# Patient Record
Sex: MALE | Race: WHITE | NOT HISPANIC OR LATINO | Employment: OTHER | URBAN - METROPOLITAN AREA
[De-identification: names, ages, dates, MRNs, and addresses within clinical notes are randomized per-mention and may not be internally consistent; named-entity substitution may affect disease eponyms.]

---

## 2017-04-27 ENCOUNTER — MYC REFILL (OUTPATIENT)
Dept: FAMILY MEDICINE | Facility: CLINIC | Age: 52
End: 2017-04-27

## 2017-04-27 DIAGNOSIS — Z71.89 OTHER SPECIFIED COUNSELING: ICD-10-CM

## 2017-04-27 RX ORDER — ZOLPIDEM TARTRATE 10 MG/1
10 TABLET ORAL
Qty: 20 TABLET | Refills: 1 | Status: SHIPPED | OUTPATIENT
Start: 2017-04-27 | End: 2017-09-26

## 2017-04-27 NOTE — TELEPHONE ENCOUNTER
FRANCINE,  Noted in 10/24/2016 OV #20 should last 6 months  Per fill dates, #40 has lasted 6 months    Controlled Substance Refill Request for Ambien    Last refill: 1/28/2017 #20     Last clinic visit: 10/24/2016     Clinic visit frequency required: not documented  Next appt: none    Controlled substance agreement on file: No.    Documentation in problem list reviewed:  No diagnosis to start documentation under    Processing:  Fax Rx to [facility] in [place] on [address] [facility]    RX [place] ([place]) reviewed:  reviewed- no concerns  [place]  [email or web address]    Please authorize if appropriate.  Thanks,  Iveth GIFFORD RN

## 2017-04-27 NOTE — TELEPHONE ENCOUNTER
Message from MyChart:  Original authorizing provider: MD Jaylan Abrahamrey Estrella would like a refill of the following medications:  zolpidem (AMBIEN) 10 MG tablet [Regis Mcelroy MD]    Preferred pharmacy: [facility] [id number] [place] [address] [address]    Comment:

## 2017-09-26 ENCOUNTER — OFFICE VISIT (OUTPATIENT)
Dept: FAMILY MEDICINE | Facility: CLINIC | Age: 52
End: 2017-09-26
Payer: COMMERCIAL

## 2017-09-26 VITALS
BODY MASS INDEX: 26.78 KG/M2 | OXYGEN SATURATION: 98 % | HEIGHT: 69 IN | TEMPERATURE: 97.7 F | HEART RATE: 95 BPM | DIASTOLIC BLOOD PRESSURE: 78 MMHG | SYSTOLIC BLOOD PRESSURE: 126 MMHG | WEIGHT: 180.8 LBS

## 2017-09-26 DIAGNOSIS — Z00.00 ROUTINE HISTORY AND PHYSICAL EXAMINATION OF ADULT: Primary | ICD-10-CM

## 2017-09-26 DIAGNOSIS — Z71.89 OTHER SPECIFIED COUNSELING: ICD-10-CM

## 2017-09-26 DIAGNOSIS — I10 ESSENTIAL HYPERTENSION WITH GOAL BLOOD PRESSURE LESS THAN 140/90: ICD-10-CM

## 2017-09-26 DIAGNOSIS — Z12.11 SPECIAL SCREENING FOR MALIGNANT NEOPLASMS, COLON: ICD-10-CM

## 2017-09-26 LAB
ANION GAP SERPL CALCULATED.3IONS-SCNC: 4 MMOL/L (ref 3–14)
BUN SERPL-MCNC: 20 MG/DL (ref 7–30)
CALCIUM SERPL-MCNC: 9.6 MG/DL (ref 8.5–10.1)
CHLORIDE SERPL-SCNC: 107 MMOL/L (ref 94–109)
CHOLEST SERPL-MCNC: 266 MG/DL
CO2 SERPL-SCNC: 30 MMOL/L (ref 20–32)
CREAT SERPL-MCNC: 0.76 MG/DL (ref 0.66–1.25)
GFR SERPL CREATININE-BSD FRML MDRD: >90 ML/MIN/1.7M2
GLUCOSE SERPL-MCNC: 103 MG/DL (ref 70–99)
HDLC SERPL-MCNC: 75 MG/DL
LDLC SERPL CALC-MCNC: 165 MG/DL
NONHDLC SERPL-MCNC: 191 MG/DL
POTASSIUM SERPL-SCNC: 4.2 MMOL/L (ref 3.4–5.3)
SODIUM SERPL-SCNC: 141 MMOL/L (ref 133–144)
TRIGL SERPL-MCNC: 132 MG/DL

## 2017-09-26 PROCEDURE — 99396 PREV VISIT EST AGE 40-64: CPT | Performed by: FAMILY MEDICINE

## 2017-09-26 PROCEDURE — 36415 COLL VENOUS BLD VENIPUNCTURE: CPT | Performed by: FAMILY MEDICINE

## 2017-09-26 PROCEDURE — 80048 BASIC METABOLIC PNL TOTAL CA: CPT | Performed by: FAMILY MEDICINE

## 2017-09-26 PROCEDURE — 80061 LIPID PANEL: CPT | Performed by: FAMILY MEDICINE

## 2017-09-26 RX ORDER — ZOLPIDEM TARTRATE 10 MG/1
10 TABLET ORAL
Qty: 20 TABLET | Refills: 1 | Status: SHIPPED | OUTPATIENT
Start: 2017-09-26 | End: 2018-02-24

## 2017-09-26 RX ORDER — LISINOPRIL 20 MG/1
20 TABLET ORAL DAILY
Qty: 90 TABLET | Refills: 3 | Status: SHIPPED | OUTPATIENT
Start: 2017-09-26 | End: 2018-10-31

## 2017-09-26 NOTE — PROGRESS NOTES
SUBJECTIVE:   CC: Darek De La Rosa is an 52 year old male who presents for preventative health visit.     Physical   Annual:     Getting at least 3 servings of Calcium per day::  NO    Bi-annual eye exam::  Yes    Dental care twice a year::  Yes    Sleep apnea or symptoms of sleep apnea::  None    Diet::  Breakfast skipped    Frequency of exercise::  2-3 days/week    Duration of exercise::  30-45 minutes    Taking medications regularly::  Yes    Medication side effects::  None    Additional concerns today::  No          Today's PHQ-2 Score: PHQ-2 ( 1999 Pfizer) 9/23/2017   Q1: Little interest or pleasure in doing things 0   Q2: Feeling down, depressed or hopeless 0   PHQ-2 Score 0   Q1: Little interest or pleasure in doing things Not at all   Q2: Feeling down, depressed or hopeless Not at all   PHQ-2 Score 0       Abuse: Current or Past(Physical, Sexual or Emotional)- No  Do you feel safe in your environment - Yes    Social History   Substance Use Topics     Smoking status: Never Smoker     Smokeless tobacco: Never Used     Alcohol use 0.0 oz/week     0 Standard drinks or equivalent per week      Comment: Social     The patient does not drink >3 drinks per day nor >7 drinks per week.    Last PSA: No results found for: PSA    Reviewed orders with patient. Reviewed health maintenance and updated orders accordingly - No    STD screening:  History   Sexual Activity     Sexual activity: Yes     Partners: Male     no screening desired today    The 10-year ASCVD risk score (Belleartem HERNÁNDEZ Jr, et al., 2013) is: 3.7%    Values used to calculate the score:      Age: 52 years      Sex: Male      Is Non- : No      Diabetic: No      Tobacco smoker: No      Systolic Blood Pressure: 126 mmHg      Is BP treated: Yes      HDL Cholesterol: 73 mg/dL      Total Cholesterol: 255 mg/dL        ROS: As per HPI.  Constitutional: no recent illness, no fevers/sweats/chills, sleep normal  Eyes: No vision changes or eye  irritation  Ears/Nose/Throat: No runny nose, sore throat or ear pain  CV: no palpitations, no chest pain, no lower extremity swelling.  Resp: no shortness of breath, wheezing, or cough.  GI: no nausea/vomiting/diarrhea, normal stooling pattern, no reflux symptoms, no black or bloody stools  : no burning or urgency with urination, no blood in urine, no sores or discharge.  Skin: no changing moles or other lesions, no rash  Musculoskeletal: no joint pain, muscle pain, weakness, trauma or injury  Psych: no depression, no concerns about anxiety  Neuro: no new headaches, dizziness    I have reviewed and updated the patient's past medical, social and family history in the EMR. Current problems are:  Patient Active Problem List    Diagnosis Date Noted     Kar evans 12/29/2014     Priority: Medium     Hypertension goal BP (blood pressure) < 140/90 01/03/2011     Priority: Medium     HYPERLIPIDEMIA LDL GOAL <160 10/31/2010     Priority: Medium     Displacement of lumbar intervertebral disc without myelopathy      Priority: Medium     iamLUMBAGO 11/07/2005     Priority: Medium     Family Hx:  Family History   Problem Relation Age of Onset     DIABETES Mother      type 2     Hypertension Mother      Lipids Mother      Hypertension Father      C.A.D. Father      CEREBROVASCULAR DISEASE Father      Optic nerve     Hypertension Maternal Grandmother      Hypertension Maternal Grandfather      C.A.D. Paternal Grandfather      Hypertension Brother      Social History:  Social History   Substance Use Topics     Smoking status: Never Smoker     Smokeless tobacco: Never Used     Alcohol use 0.0 oz/week     0 Standard drinks or equivalent per week      Comment: Social     Social History     Social History Narrative    Social Documentation:        Balanced Diet: YES    Calcium intake: 3 per day    Caffeine: Maybe 1 per day    Exercise:  type of activity running;  20 miles per week     Sunscreen: No - discussed    Seatbelts:  Yes     "Self Testicular Exam: No - discussed    Physical/Emotional/Sexual Abuse: No -     Do you feel safe in your environment? Yes        Cholesterol screen up to date: Yes 07/31/07    Eye Exam up to date: No - discussed optometry exam    Dental Exam up to date: Yes    Dexa Scan up to date: Does Not Apply    Colonoscopy up to date: Does Not Apply    Immunizations up to date: Yes- TDAP- 2004-     Glucose screen if over 40:  Yes 07/31/07        Guillermina Mojica MA    07/31/07     Allergies:     Allergies   Allergen Reactions     No Known Allergies       Current Medications:  Current Outpatient Prescriptions   Medication Sig Dispense Refill     zolpidem (AMBIEN) 10 MG tablet Take 1 tablet (10 mg) by mouth nightly as needed for sleep 20 tablet 1     lisinopril (PRINIVIL,ZESTRIL) 20 MG tablet Take 1 tablet (20 mg) by mouth daily 90 tablet prn        Objective:  /78  Pulse 95  Temp 97.7  F (36.5  C) (Oral)  Ht 5' 9\" (1.753 m)  Wt 180 lb 12.8 oz (82 kg)  SpO2 98%  BMI 26.7 kg/m2  General Appearance: Pleasant, alert, WN/WD in no acute respiratory distress.  Head Exam: Normal. Normocephalic, atraumatic. No sinus tenderness.  Eye Exam: Normal external eye, conjunctiva, lids. RUDY.  Ear Exam: Normal auditory canals and external ears. Non-tender.  Left TM-normal. Right TM-normal.  OroPharynx Exam: Dental hygiene adequate. Normal buccal mucosa. Normal pharynx.  Neck Exam: Supple, no masses or enlarged, tender nodes.  Thyroid Exam: No nodules or enlargement or tenderness.  Chest/Respiratory Exam: Normal, comfortable, easy respirations. Chest wall normal. Lungs are clear to auscultation. No wheezing, crackles, or rhonchi.  Cardiovascular Exam: Regular rate and rhythm. No murmur, gallop, or rubs. No pedal edema.  Gastrointestinal Exam: Soft, non-tender, no masses or organomegaly.  Musculoskeletal Exam: Back is non-tender, full ROM of upper and lower extremities.  Skin: no rash, warm and dry.  Neurologic Exam: Nonfocal, no " "tremor. Normal gait.  Psychiatric Exam: Alert - appropriate, normal affect    ASSESSMENT/PLAN:    ICD-10-CM    1. Routine history and physical examination of adult Z00.00 Lipid panel reflex to direct LDL   2. Special screening for malignant neoplasms, colon Z12.11 GASTROENTEROLOGY ADULT REF PROCEDURE ONLY   3. Other specified counseling Z71.89 zolpidem (AMBIEN) 10 MG tablet    frequent international travel-20 ambien q 6 months   4. Essential hypertension with goal blood pressure less than 140/90 I10 Lipid panel reflex to direct LDL     lisinopril (PRINIVIL/ZESTRIL) 20 MG tablet     Basic metabolic panel     Stable hypertension needs medication refill also stable insomnia primarily with travel  Limited use of zolpidem  Discussed potential for impairment and dependence with Z medications, a limited Rx prescribed        COUNSELING:   Reviewed preventive health counseling, as reflected in patient instructions       Regular exercise       Healthy diet/nutrition    BP Screening:   Last 3 BP Readings:    BP Readings from Last 3 Encounters:   09/26/17 126/78   10/24/16 (!) 139/95   12/29/14 139/80          reports that he has never smoked. He has never used smokeless tobacco.      Estimated body mass index is 27.02 kg/(m^2) as calculated from the following:    Height as of 10/24/16: 5' 9\" (1.753 m).    Weight as of 10/24/16: 183 lb (83 kg).   Weight management plan: Discussed healthy diet and exercise guidelines and patient will follow up in 12 months in clinic to re-evaluate.    Counseling Resources:  ATP IV Guidelines  Pooled Cohorts Equation Calculator  FRAX Risk Assessment  ICSI Preventive Guidelines  Dietary Guidelines for Americans, 2010  USDA's MyPlate  ASA Prophylaxis  Lung CA Screening    Nate Rene Todd MD  [facility]  Answers for HPI/ROS submitted by the patient on 9/23/2017   PHQ-2 Score: 0    "

## 2017-09-26 NOTE — MR AVS SNAPSHOT
After Visit Summary   9/26/2017    Darek De La Rosa    MRN: [id number]           Patient Information     Date Of Birth          1965        Visit Information        Provider Department      9/26/2017 8:30 AM Nate Todd MD [facility]        Today's Diagnoses     Routine history and physical examination of adult    -  1    Special screening for malignant neoplasms, colon        Other specified counseling        Essential hypertension with goal blood pressure less than 140/90          Care Instructions      Preventive Health Recommendations  Male Ages 50 - 64    Yearly exam:             See your health care provider every year in order to  o   Review health changes.   o   Discuss preventive care.    o   Review your medicines if your doctor has prescribed any.     Have a cholesterol test every 5 years, or more frequently if you are at risk for high cholesterol/heart disease.     Have a diabetes test (fasting glucose) every three years. If you are at risk for diabetes, you should have this test more often.     Have a colonoscopy at age 50, or have a yearly FIT test (stool test). These exams will check for colon cancer.      Talk with your health care provider about whether or not a prostate cancer screening test (PSA) is right for you.    You should be tested each year for STDs (sexually transmitted diseases), if you re at risk.     Shots: Get a flu shot each year. Get a tetanus shot every 10 years.     Nutrition:    Eat at least 5 servings of fruits and vegetables daily.     Eat whole-grain bread, whole-wheat pasta and brown rice instead of white grains and rice.     Talk to your provider about Calcium and Vitamin D.     Lifestyle    Exercise for at least 150 minutes a week (30 minutes a day, 5 days a week). This will help you control your weight and prevent disease.     Limit alcohol to one drink per day.     No smoking.     Wear sunscreen to prevent skin cancer.     See your  dentist every six months for an exam and cleaning.     See your eye doctor every 1 to 2 years.            Follow-ups after your visit        Additional Services     GASTROENTEROLOGY ADULT REF PROCEDURE ONLY       Last Lab Result: Creatinine (mg/dL)       Date                     Value                 12/29/2014               0.79             ----------  Body mass index is 26.7 kg/(m^2).      Patient will be contacted to schedule procedure.     Please be aware that coverage of these services is subject to the terms and limitations of your health insurance plan.  Call member services at your health plan with any benefit or coverage questions.  Any procedures must be performed at a [place] facility OR coordinated by your clinic's referral office.    Please bring the following with you to your appointment:    (1) Any X-Rays, CTs or MRIs which have been performed.  Contact the facility where they were done to arrange for  prior to your scheduled appointment.    (2) List of current medications   (3) This referral request   (4) Any documents/labs given to you for this referral                  Who to contact     If you have questions or need follow up information about today's clinic visit or your schedule please contact [facility] directly at [phone].  Normal or non-critical lab and imaging results will be communicated to you by MyChart, letter or phone within 4 business days after the clinic has received the results. If you do not hear from us within 7 days, please contact the clinic through Zelnashart or phone. If you have a critical or abnormal lab result, we will notify you by phone as soon as possible.  Submit refill requests through IndiaIdeas or call your pharmacy and they will forward the refill request to us. Please allow 3 business days for your refill to be completed.          Additional Information About Your Visit        ZelnasharCaptual Information     IndiaIdeas gives you secure access to your  "electronic health record. If you see a primary care provider, you can also send messages to your care team and make appointments. If you have questions, please call your primary care clinic.  If you do not have a primary care provider, please call [phone] and they will assist you.        Care EveryWhere ID     This is your Care EveryWhere ID. This could be used by other organizations to access your [place] medical records  [id number]        Your Vitals Were     Pulse Temperature Height Pulse Oximetry BMI (Body Mass Index)       95 97.7  F (36.5  C) (Oral) 5' 9\" (1.753 m) 98% 26.7 kg/m2        Blood Pressure from Last 3 Encounters:   09/26/17 126/78   10/24/16 (!) 139/95   12/29/14 139/80    Weight from Last 3 Encounters:   09/26/17 180 lb 12.8 oz (82 kg)   10/24/16 183 lb (83 kg)   12/29/14 175 lb (79.4 kg)              We Performed the Following     Basic metabolic panel     GASTROENTEROLOGY ADULT REF PROCEDURE ONLY     Lipid panel reflex to direct LDL          Where to get your medicines      These medications were sent to [facility] [id number] [place] [address] & [address]  [address] [place] [address]     Phone:  [phone]     lisinopril 20 MG tablet         Some of these will need a paper prescription and others can be bought over the counter.  Ask your nurse if you have questions.     Bring a paper prescription for each of these medications     zolpidem 10 MG tablet          Primary Care Provider Office Phone # Fax #    Nate Rene Todd [phone] [address] [facility] [address]        Equal Access to Services     AQUILES CROW AH: Hadvalencia Faith, waaneeshda lucharlesadaha, qaybta kaalmada guerda, lou walker. So [facility] [phone].    ATENCIÓN: Si habla español, tiene a walsh disposición servicios gratuitos de asistencia lingüística. Llame al [phone].    We comply with " applicable federal civil rights laws and [place] laws. We do not discriminate on the basis of race, color, national origin, age, disability sex, sexual orientation or gender identity.            Thank you!     Thank you for choosing [facility]  for your care. Our goal is always to provide you with excellent care. Hearing back from our patients is one way we can continue to improve our services. Please take a few minutes to complete the written survey that you may receive in the mail after your visit with us. Thank you!             Your Updated Medication List - Protect others around you: Learn how to safely use, store and throw away your medicines at www.disposemymeds.org.          This list is accurate as of: 9/26/17  6:02 PM.  Always use your most recent med list.                   Brand Name Dispense Instructions for use Diagnosis    lisinopril 20 MG tablet    PRINIVIL/ZESTRIL    90 tablet    Take 1 tablet (20 mg) by mouth daily    Essential hypertension with goal blood pressure less than 140/90       zolpidem 10 MG tablet    AMBIEN    20 tablet    Take 1 tablet (10 mg) by mouth nightly as needed for sleep    Other specified counseling

## 2017-11-16 ENCOUNTER — ALLIED HEALTH/NURSE VISIT (OUTPATIENT)
Dept: NURSING | Facility: CLINIC | Age: 52
End: 2017-11-16
Payer: COMMERCIAL

## 2017-11-16 DIAGNOSIS — Z23 NEED FOR PROPHYLACTIC VACCINATION AND INOCULATION AGAINST INFLUENZA: Primary | ICD-10-CM

## 2017-11-16 PROCEDURE — 90686 IIV4 VACC NO PRSV 0.5 ML IM: CPT

## 2017-11-16 PROCEDURE — 90471 IMMUNIZATION ADMIN: CPT

## 2017-11-16 PROCEDURE — 99207 ZZC NO CHARGE NURSE ONLY: CPT

## 2017-11-16 NOTE — MR AVS SNAPSHOT
After Visit Summary   11/16/2017    Darek De La Rosa    MRN: [id number]           Patient Information     Date Of Birth          1965        Visit Information        Provider Department      11/16/2017 9:45 AM UP [facility]LES NURSE [place] Nurse        Today's Diagnoses     Need for prophylactic vaccination and inoculation against influenza    -  1       Follow-ups after your visit        Your next 10 appointments already scheduled     Dec 14, 2017   Procedure with Ej Swanson MD   [facility] ([facility])    [address]  [place] [address] [phone]           [facility] is located at [address] [place]              Who to contact     If you have questions or need follow up information about today's clinic visit or your schedule please contact [place] [facility] NURSE directly at [phone].  Normal or non-critical lab and imaging results will be communicated to you by MyChart, letter or phone within 4 business days after the clinic has received the results. If you do not hear from us within 7 days, please contact the clinic through Pikumhart or phone. If you have a critical or abnormal lab result, we will notify you by phone as soon as possible.  Submit refill requests through Covacsis or call your pharmacy and they will forward the refill request to us. Please allow 3 business days for your refill to be completed.          Additional Information About Your Visit        MyChart Information     Covacsis gives you secure access to your electronic health record. If you see a primary care provider, you can also send messages to your care team and make appointments. If you have questions, please call your primary care clinic.  If you do not have a primary care provider, please call [phone] and they will assist you.        Care EveryWhere ID     This is your Care EveryWhere ID. This could be used by other organizations to access  your [place] medical records  [id number]         Blood Pressure from Last 3 Encounters:   09/26/17 126/78   10/24/16 (!) 139/95   12/29/14 139/80    Weight from Last 3 Encounters:   09/26/17 180 lb 12.8 oz (82 kg)   10/24/16 183 lb (83 kg)   12/29/14 175 lb (79.4 kg)              We Performed the Following     FLU VAC, SPLIT VIRUS IM > 3 YO (QUADRIVALENT) [75208]     Vaccine Administration, Initial [49365]        Primary Care Provider Office Phone # Fax #    Nate Rene Todd [phone] [address] [facility] [address]        Equal Access to Services     AQUILES CROW : Hadii aad ku hadasho Somaribethali, waaxda luqadaha, qaybta kaalmada adeegyada, waxamy walker. So [facility] [phone].    ATENCIÓN: Si habla español, tiene a walsh disposición servicios gratuitos de asistencia lingüística. LlK[facility]tering [facility] [phone].    We comply with applicable federal civil rights laws and [place] laws. We do not discriminate on the basis of race, color, national origin, age, disability, sex, sexual orientation, or gender identity.            Thank you!     Thank you for choosing [facility] N[facility]SE  for your care. Our goal is always to provide you with excellent care. Hearing back from our patients is one way we can continue to improve our services. Please take a few minutes to complete the written survey that you may receive in the mail after your visit with us. Thank you!             Your Updated Medication List - Protect others around you: Learn how to safely use, store and throw away your medicines at www.disposemymeds.org.          This list is accurate as of: 11/16/17 10:01 AM.  Always use your most recent med list.                   Brand Name Dispense Instructions for use Diagnosis    lisinopril 20 MG tablet    PRINIVIL/ZESTRIL    90 tablet    Take 1 tablet (20 mg) by mouth daily    Essential hypertension with goal blood pressure less than 140/90       zolpidem 10  MG tablet    AMBIEN    20 tablet    Take 1 tablet (10 mg) by mouth nightly as needed for sleep    Other specified counseling

## 2017-11-16 NOTE — PROGRESS NOTES

## 2017-12-20 RX ORDER — ONDANSETRON 2 MG/ML
4 INJECTION INTRAMUSCULAR; INTRAVENOUS
Status: CANCELLED | OUTPATIENT
Start: 2017-12-20

## 2017-12-20 RX ORDER — LIDOCAINE 40 MG/G
CREAM TOPICAL
Status: CANCELLED | OUTPATIENT
Start: 2017-12-20

## 2017-12-21 ENCOUNTER — HOSPITAL ENCOUNTER (OUTPATIENT)
Facility: CLINIC | Age: 52
Discharge: HOME | End: 2017-12-21
Attending: SPECIALIST | Admitting: SPECIALIST
Payer: COMMERCIAL

## 2017-12-21 ENCOUNTER — SURGERY (OUTPATIENT)
Age: 52
End: 2017-12-21

## 2017-12-21 VITALS
OXYGEN SATURATION: 98 % | RESPIRATION RATE: 12 BRPM | SYSTOLIC BLOOD PRESSURE: 118 MMHG | WEIGHT: 175 LBS | BODY MASS INDEX: 25.92 KG/M2 | HEIGHT: 69 IN | DIASTOLIC BLOOD PRESSURE: 82 MMHG

## 2017-12-21 LAB — COLONOSCOPY: NORMAL

## 2017-12-21 PROCEDURE — 99153 MOD SED SAME PHYS/QHP EA: CPT | Performed by: SPECIALIST

## 2017-12-21 PROCEDURE — 25000128 H RX IP 250 OP 636: Performed by: SPECIALIST

## 2017-12-21 PROCEDURE — G0121 COLON CA SCRN NOT HI RSK IND: HCPCS | Performed by: SPECIALIST

## 2017-12-21 PROCEDURE — 45378 DIAGNOSTIC COLONOSCOPY: CPT | Performed by: SPECIALIST

## 2017-12-21 RX ORDER — FENTANYL CITRATE 50 UG/ML
INJECTION, SOLUTION INTRAMUSCULAR; INTRAVENOUS PRN
Status: DISCONTINUED | OUTPATIENT
Start: 2017-12-21 | End: 2017-12-21 | Stop reason: HOSPADM

## 2017-12-21 RX ADMIN — FENTANYL CITRATE 50 MCG: 50 INJECTION, SOLUTION INTRAMUSCULAR; INTRAVENOUS at 10:10

## 2017-12-21 RX ADMIN — MIDAZOLAM 1 MG: 1 INJECTION INTRAMUSCULAR; INTRAVENOUS at 10:10

## 2017-12-21 RX ADMIN — FENTANYL CITRATE 100 MCG: 50 INJECTION, SOLUTION INTRAMUSCULAR; INTRAVENOUS at 10:03

## 2017-12-21 RX ADMIN — MIDAZOLAM 2 MG: 1 INJECTION INTRAMUSCULAR; INTRAVENOUS at 10:03

## 2017-12-21 NOTE — BRIEF OP NOTE
[facility] Brief Operative Note    Pre-operative diagnosis: screening   Post-operative diagnosis normal     Procedure: Procedure(s):  colonoscopy - Wound Class: II-Clean Contaminated   Surgeon(s): Surgeon(s) and Role:     * Ej Swanson MD - Primary   Estimated blood loss: * No values recorded between 12/21/2017 12:00 AM and 12/21/2017 10:26 AM *    Specimens: * No specimens in log *   Findings: Please see ProVation procedure note in Chart Review

## 2017-12-21 NOTE — H&P
Pre-Endoscopy History and Physical     Darek De La Rosa MRN# [id number]   YOB: 1965 Age: 52 year old     Date of Procedure: 12/21/2017  Primary care provider: Nate Todd  Type of Endoscopy: Colonoscopy with possible biopsy, possible polypectomy  Reason for Procedure: screening  Type of Anesthesia Anticipated: Conscious Sedation    HPI:    Darek is a 52 year old male who will be undergoing the above procedure.      A history and physical has been performed. The patient's medications and allergies have been reviewed. The risks and benefits of the procedure and the sedation options and risks were discussed with the patient.  All questions were answered and informed consent was obtained.      He denies a personal or family history of anesthesia complications or bleeding disorders.     Patient Active Problem List   Diagnosis     iamLUMBAGO     Displacement of lumbar intervertebral disc without myelopathy     HYPERLIPIDEMIA LDL GOAL <160     Hypertension goal BP (blood pressure) < 140/90     Jock itch        Past Medical History:   Diagnosis Date     Displacement of lumbar intervertebral disc without myelopathy 2/07     Hypertension      Pure hypercholesterolemia         Past Surgical History:   Procedure Laterality Date     HC REMOVAL OF TONSILS,<13 Y/O  1972    Tonsils <14y.o.     HEAD & NECK SURGERY      wisdom teeth     SURGICAL HISTORY OF -   2004    wisdom teeth       Social History   Substance Use Topics     Smoking status: Never Smoker     Smokeless tobacco: Never Used     Alcohol use 0.0 oz/week     0 Standard drinks or equivalent per week      Comment: Social       Family History   Problem Relation Age of Onset     DIABETES Mother      type 2     Hypertension Mother      Lipids Mother      Hypertension Father      C.A.D. Father      CEREBROVASCULAR DISEASE Father      Optic nerve     Hypertension Maternal Grandmother      Hypertension Maternal Grandfather      C.A.D. Paternal  "Grandfather      Hypertension Brother        Prior to Admission medications    Medication Sig Start Date End Date Taking? Authorizing Provider   zolpidem (AMBIEN) 10 MG tablet Take 1 tablet (10 mg) by mouth nightly as needed for sleep 9/26/17  Yes Nate Todd MD   lisinopril (PRINIVIL/ZESTRIL) 20 MG tablet Take 1 tablet (20 mg) by mouth daily 9/26/17  Yes Nate Todd MD       Allergies   Allergen Reactions     No Known Allergies         REVIEW OF SYSTEMS:   5 point ROS negative except as noted above in HPI, including Gen., Resp., CV, GI &  system review.    PHYSICAL EXAM:   /85  Ht 1.753 m (5' 9\")  Wt 79.4 kg (175 lb)  SpO2 97%  BMI 25.84 kg/m2 Estimated body mass index is 25.84 kg/(m^2) as calculated from the following:    Height as of this encounter: 1.753 m (5' 9\").    Weight as of this encounter: 79.4 kg (175 lb).   GENERAL APPEARANCE: alert, and oriented  MENTAL STATUS: alert  AIRWAY EXAM: Mallampatti Class II (visualization of the soft palate, fauces, and uvula)  RESP: lungs clear to auscultation - no rales, rhonchi or wheezes  CV: regular rates and rhythm  DIAGNOSTICS:    Not indicated    IMPRESSION   ASA Class 2 - Mild systemic disease    PLAN:   Plan for Colonoscopy with possible biopsy, possible polypectomy. We discussed the risks, benefits and alternatives and the patient wished to proceed.    The above has been forwarded to the consulting provider.      Signed Electronically by: Ej Swanson  December 21, 2017          "

## 2018-02-24 ENCOUNTER — MYC REFILL (OUTPATIENT)
Dept: FAMILY MEDICINE | Facility: CLINIC | Age: 53
End: 2018-02-24

## 2018-02-24 DIAGNOSIS — Z71.89 OTHER SPECIFIED COUNSELING: ICD-10-CM

## 2018-02-26 NOTE — TELEPHONE ENCOUNTER
Zolpidem      Last Written Prescription Date:  9-26-17  Last Fill Quantity: 20,   # refills: 1  Last Office Visit: 9-26-17  Future Office visit:       Routing refill request to provider for review/approval because:  Drug not on the [facility], P or [facility] refill protocol or controlled substance

## 2018-02-26 NOTE — TELEPHONE ENCOUNTER
JF,    Controlled Substance Refill Request for Ambien    Last refill: 11/16/2017 #20    Last clinic visit: 9/26/2017     Clinic visit frequency required: not documented  Next appt: none    Controlled substance agreement on file: No.    Documentation in problem list reviewed:  Insomnia not on problem list    Processing:  Fax Rx to [facility]    RX monitoring [place] ([place]) reviewed:  reviewed- no concerns  [place]:  [email or web address]    Please authorize if appropriate.  Thanks,  Iveth GIFFORD RN

## 2018-02-26 NOTE — TELEPHONE ENCOUNTER
Message from MyChart:  Original authorizing provider: Nate Todd MD    Darek De La Rosa would like a refill of the following medications:  zolpidem (AMBIEN) 10 MG tablet [Nate Todd MD]    Preferred pharmacy: [facility] [id number] [place] [address] & [address]    Comment:

## 2018-03-02 RX ORDER — ZOLPIDEM TARTRATE 10 MG/1
10 TABLET ORAL
Qty: 20 TABLET | Refills: 1 | Status: SHIPPED | OUTPATIENT
Start: 2018-03-02 | End: 2019-01-15

## 2018-09-18 DIAGNOSIS — Z71.89 OTHER SPECIFIED COUNSELING: ICD-10-CM

## 2018-09-18 NOTE — TELEPHONE ENCOUNTER
Zolpidem      Last Written Prescription Date:  3-2-18  Last Fill Quantity: 20,   # refills: 1  Last Office Visit: 9-26-17  Future Office visit:       Routing refill request to provider for review/approval because:  Drug not on the [facility], P or [facility] refill protocol or controlled substance

## 2018-09-19 RX ORDER — ZOLPIDEM TARTRATE 10 MG/1
10 TABLET ORAL
Start: 2018-09-19

## 2018-09-19 NOTE — TELEPHONE ENCOUNTER
Due for physical.  Last seen 9/26/17  [facility] message sent to patient asking him to schedule appointment.  Kim Parker RN

## 2018-10-31 DIAGNOSIS — I10 ESSENTIAL HYPERTENSION WITH GOAL BLOOD PRESSURE LESS THAN 140/90: ICD-10-CM

## 2018-10-31 RX ORDER — LISINOPRIL 20 MG/1
TABLET ORAL
Qty: 30 TABLET | Refills: 0 | Status: SHIPPED | OUTPATIENT
Start: 2018-10-31 | End: 2018-11-23

## 2018-10-31 NOTE — TELEPHONE ENCOUNTER
"Medication is being filled for 1 time refill only due to:  Patient needs to be seen because it has been more than one year since last visit.   Iveth GIFFORD RN    Requested Prescriptions   Pending Prescriptions Disp Refills     lisinopril (PRINIVIL/ZESTRIL) 20 MG tablet [Pharmacy Med Name: LISINOPRIL 20MG TABLETS] 90 tablet 0     Sig: TAKE 1 TABLET(20 MG) BY MOUTH DAILY    ACE Inhibitors (Including Combos) Protocol Failed    10/31/2018  9:33 AM       Failed - Recent (12 mo) or future (30 days) visit within the authorizing provider's specialty    Patient had office visit in the last 12 months or has a visit in the next 30 days with authorizing provider or within the authorizing provider's specialty.  See \"Patient Info\" tab in inbasket, or \"Choose Columns\" in Meds & Orders section of the refill encounter.             Failed - Normal serum creatinine on file in past 12 months    Recent Labs   Lab Test  09/26/17   0902   CR  0.76            Failed - Normal serum potassium on file in past 12 months    Recent Labs   Lab Test  09/26/17   0902   POTASSIUM  4.2            Passed - Blood pressure under 140/90 in past 12 months    BP Readings from Last 3 Encounters:   12/21/17 118/82   09/26/17 126/78   10/24/16 (!) 139/95                Passed - Patient is age 18 or older            "

## 2018-11-23 DIAGNOSIS — I10 ESSENTIAL HYPERTENSION WITH GOAL BLOOD PRESSURE LESS THAN 140/90: ICD-10-CM

## 2018-11-26 NOTE — TELEPHONE ENCOUNTER
"1 month Rx sent 10/31/2018  Last OV 9/26/2017  Due for physical  MyChart sent to pt  Iveth GIFFORD RN    Requested Prescriptions   Pending Prescriptions Disp Refills     lisinopril (PRINIVIL/ZESTRIL) 20 MG tablet [Pharmacy Med Name: LISINOPRIL 20MG TABLETS] 30 tablet 0     Sig: TAKE 1 TABLET BY MOUTH DAILY( ONE MONTH REFILL ONLY. PATIENT DUE FOR APPOINTMENT)    ACE Inhibitors (Including Combos) Protocol Failed    11/23/2018  9:32 AM       Failed - Recent (12 mo) or future (30 days) visit within the authorizing provider's specialty    Patient had office visit in the last 12 months or has a visit in the next 30 days with authorizing provider or within the authorizing provider's specialty.  See \"Patient Info\" tab in inbasket, or \"Choose Columns\" in Meds & Orders section of the refill encounter.             Failed - Normal serum creatinine on file in past 12 months    Recent Labs   Lab Test  09/26/17   0902   CR  0.76            Failed - Normal serum potassium on file in past 12 months    Recent Labs   Lab Test  09/26/17   0902   POTASSIUM  4.2            Passed - Blood pressure under 140/90 in past 12 months    BP Readings from Last 3 Encounters:   12/21/17 118/82   09/26/17 126/78   10/24/16 (!) 139/95                Passed - Patient is age 18 or older              "

## 2018-12-04 NOTE — TELEPHONE ENCOUNTER
JF,   Left  for patient  See below messages  No response from patient to our outreach  Patient read MyChart but did not schedule  Please advise on further refill  Thanks,  Iveth GIFFORD RN

## 2018-12-06 RX ORDER — LISINOPRIL 20 MG/1
20 TABLET ORAL DAILY
Qty: 15 TABLET | Refills: 0 | Status: SHIPPED | OUTPATIENT
Start: 2018-12-06 | End: 2018-12-15

## 2018-12-15 DIAGNOSIS — I10 ESSENTIAL HYPERTENSION WITH GOAL BLOOD PRESSURE LESS THAN 140/90: ICD-10-CM

## 2018-12-17 RX ORDER — LISINOPRIL 20 MG/1
20 TABLET ORAL DAILY
Qty: 30 TABLET | Refills: 0 | Status: SHIPPED | OUTPATIENT
Start: 2018-12-17 | End: 2019-01-15

## 2018-12-17 NOTE — TELEPHONE ENCOUNTER
"2 week Rx sent 2/6/2018 - due for appt  Has upcoming OV - refilled for 1 month  Iveth GIFFORD, RN    Requested Prescriptions   Pending Prescriptions Disp Refills     lisinopril (PRINIVIL/ZESTRIL) 20 MG tablet [Pharmacy Med Name: LISINOPRIL 20MG TABLETS] 15 tablet 0     Sig: TAKE 1 TABLET(20 MG) BY MOUTH DAILY FOR 2 WEEKS ONLY( REFILLS NEEDS AN OFFICE VISIT)    ACE Inhibitors (Including Combos) Protocol Failed - 12/15/2018  9:31 AM       Failed - Recent (12 mo) or future (30 days) visit within the authorizing provider's specialty    Patient had office visit in the last 12 months or has a visit in the next 30 days with authorizing provider or within the authorizing provider's specialty.  See \"Patient Info\" tab in inbasket, or \"Choose Columns\" in Meds & Orders section of the refill encounter.             Failed - Normal serum creatinine on file in past 12 months    Recent Labs   Lab Test 09/26/17  0902   CR 0.76            Failed - Normal serum potassium on file in past 12 months    Recent Labs   Lab Test 09/26/17  0902   POTASSIUM 4.2            Passed - Blood pressure under 140/90 in past 12 months    BP Readings from Last 3 Encounters:   12/21/17 118/82   09/26/17 126/78   10/24/16 (!) 139/95                Passed - Patient is age 18 or older        Next 5 appointments (look out 90 days)    Wyatt 15, 2019  9:00 AM CST  [facility] with Nate Rene Todd MD  [facility] ([facility]) [address]  [facility] [address] [phone]          "

## 2019-01-10 DIAGNOSIS — I10 ESSENTIAL HYPERTENSION WITH GOAL BLOOD PRESSURE LESS THAN 140/90: ICD-10-CM

## 2019-01-11 RX ORDER — LISINOPRIL 20 MG/1
TABLET ORAL
Start: 2019-01-11

## 2019-01-11 NOTE — TELEPHONE ENCOUNTER
Last seen 9/26/17  Given one month supply 12/17/18.  Future OV 1/15/19. Refills will be addressed at OV.  Kim Parker RN

## 2019-01-11 NOTE — TELEPHONE ENCOUNTER
"Requested Prescriptions   Pending Prescriptions Disp Refills     lisinopril (PRINIVIL/ZESTRIL) 20 MG tablet [Pharmacy Med Name: LISINOPRIL 20MG TABLETS]  Last Written Prescription Date:  12/17/2018  Last Fill Quantity: 30 tablet,  # refills: 0   Last Office Visit: 9/26/2017   Future Office Visit:    Next 5 appointments (look out 90 days)    Wyatt 15, 2019  9:00 AM CST  [facility] Physical Adult with Nate Rene Todd MD  [facility] ([facility]) [address] [facility] [address]  [phone]          30 tablet 0     Sig: TAKE 1 TABLET( 20 MG) BY MOUTH DAILY    ACE Inhibitors (Including Combos) Protocol Failed - 1/10/2019  9:32 AM       Failed - Blood pressure under 140/90 in past 12 months    BP Readings from Last 3 Encounters:   12/21/17 118/82   09/26/17 126/78   10/24/16 (!) 139/95                Failed - Normal serum creatinine on file in past 12 months    Recent Labs   Lab Test 09/26/17  0902   CR 0.76            Failed - Normal serum potassium on file in past 12 months    Recent Labs   Lab Test 09/26/17  0902   POTASSIUM 4.2            Passed - Recent (12 mo) or future (30 days) visit within the authorizing provider's specialty    Patient had office visit in the last 12 months or has a visit in the next 30 days with authorizing provider or within the authorizing provider's specialty.  See \"Patient Info\" tab in inbasket, or \"Choose Columns\" in Meds & Orders section of the refill encounter.             Passed - Medication is active on med list       Passed - Patient is age 18 or older          "

## 2019-01-15 ENCOUNTER — OFFICE VISIT (OUTPATIENT)
Dept: FAMILY MEDICINE | Facility: CLINIC | Age: 54
End: 2019-01-15
Payer: COMMERCIAL

## 2019-01-15 VITALS
TEMPERATURE: 97.6 F | HEIGHT: 69 IN | RESPIRATION RATE: 16 BRPM | HEART RATE: 64 BPM | DIASTOLIC BLOOD PRESSURE: 74 MMHG | WEIGHT: 179 LBS | SYSTOLIC BLOOD PRESSURE: 130 MMHG | BODY MASS INDEX: 26.51 KG/M2

## 2019-01-15 DIAGNOSIS — I10 ESSENTIAL HYPERTENSION WITH GOAL BLOOD PRESSURE LESS THAN 140/90: ICD-10-CM

## 2019-01-15 DIAGNOSIS — E78.5 HYPERLIPIDEMIA LDL GOAL <160: ICD-10-CM

## 2019-01-15 DIAGNOSIS — Z00.00 ROUTINE HISTORY AND PHYSICAL EXAMINATION OF ADULT: Primary | ICD-10-CM

## 2019-01-15 LAB
ANION GAP SERPL CALCULATED.3IONS-SCNC: 10 MMOL/L (ref 3–14)
BUN SERPL-MCNC: 19 MG/DL (ref 7–30)
CALCIUM SERPL-MCNC: 9.5 MG/DL (ref 8.5–10.1)
CHLORIDE SERPL-SCNC: 108 MMOL/L (ref 94–109)
CHOLEST SERPL-MCNC: 272 MG/DL
CO2 SERPL-SCNC: 21 MMOL/L (ref 20–32)
CREAT SERPL-MCNC: 0.78 MG/DL (ref 0.66–1.25)
GFR SERPL CREATININE-BSD FRML MDRD: >90 ML/MIN/{1.73_M2}
GLUCOSE SERPL-MCNC: 106 MG/DL (ref 70–99)
HDLC SERPL-MCNC: 71 MG/DL
LDLC SERPL CALC-MCNC: 185 MG/DL
NONHDLC SERPL-MCNC: 201 MG/DL
POTASSIUM SERPL-SCNC: 4.1 MMOL/L (ref 3.4–5.3)
SODIUM SERPL-SCNC: 139 MMOL/L (ref 133–144)
TRIGL SERPL-MCNC: 79 MG/DL

## 2019-01-15 PROCEDURE — 99396 PREV VISIT EST AGE 40-64: CPT | Performed by: FAMILY MEDICINE

## 2019-01-15 PROCEDURE — 80061 LIPID PANEL: CPT | Performed by: FAMILY MEDICINE

## 2019-01-15 PROCEDURE — 36415 COLL VENOUS BLD VENIPUNCTURE: CPT | Performed by: FAMILY MEDICINE

## 2019-01-15 PROCEDURE — 80048 BASIC METABOLIC PNL TOTAL CA: CPT | Performed by: FAMILY MEDICINE

## 2019-01-15 RX ORDER — LISINOPRIL 20 MG/1
20 TABLET ORAL DAILY
Qty: 90 TABLET | Refills: 3 | Status: SHIPPED | OUTPATIENT
Start: 2019-01-15 | End: 2019-11-03

## 2019-01-15 ASSESSMENT — MIFFLIN-ST. JEOR: SCORE: 1652.32

## 2019-01-15 NOTE — NURSING NOTE
"Chief Complaint   Patient presents with     Physical     /74   Pulse 64   Temp 97.6  F (36.4  C) (Oral)   Resp 16   Ht 1.753 m (5' 9\")   Wt 81.2 kg (179 lb)   BMI 26.43 kg/m   Estimated body mass index is 26.43 kg/m  as calculated from the following:    Height as of this encounter: 1.753 m (5' 9\").    Weight as of this encounter: 81.2 kg (179 lb).        Health Maintenance due pending provider review:  NONE    n/a    Faby Brian CMA  "

## 2019-01-15 NOTE — PROGRESS NOTES
SUBJECTIVE:   CC: Darek De La Rosa is an 54 year old male who presents for preventative health visit.     Physical   Annual:     Getting at least 3 servings of Calcium per day:  Yes    Bi-annual eye exam:  Yes    Dental care twice a year:  Yes    Sleep apnea or symptoms of sleep apnea:  None    Diet:  Regular (no restrictions)    Frequency of exercise:  4-5 days/week    Duration of exercise:  30-45 minutes    Taking medications regularly:  Yes    Medication side effects:  None    Additional concerns today:  No    PHQ-2 Total Score: 0      The 10-year ASCVD risk score (Belle HERNÁNDEZ Jr., et al., 2013) is: 4.9%    Values used to calculate the score:      Age: 54 years      Sex: Male      Is Non- : No      Diabetic: No      Tobacco smoker: No      Systolic Blood Pressure: 130 mmHg      Is BP treated: Yes      HDL Cholesterol: 75 mg/dL      Total Cholesterol: 266 mg/dL    Intermediate risk, discussed Calcium scan as an option for an additional risk stratification          Today's PHQ-2 Score:   PHQ-2 ( 1999 Pfizer) 1/15/2019   Q1: Little interest or pleasure in doing things 0   Q2: Feeling down, depressed or hopeless 0   PHQ-2 Score 0   Q1: Little interest or pleasure in doing things Not at all   Q2: Feeling down, depressed or hopeless Not at all   PHQ-2 Score 0       Abuse: Current or Past(Physical, Sexual or Emotional)- NO  Do you feel safe in your environment? YES    Social History     Tobacco Use     Smoking status: Never Smoker     Smokeless tobacco: Never Used   Substance Use Topics     Alcohol use: Yes     Alcohol/week: 0.0 oz     Comment: Social     Alcohol Use 1/15/2019   If you drink alcohol do you typically have greater than 3 drinks per day OR greater than 7 drinks per week? No   No flowsheet data found.    Last PSA: No results found for: PSA    Reviewed orders with patient. Reviewed health maintenance and updated orders accordingly - Yes  Labs reviewed in EPIC  BP Readings from Last 3  Encounters:   01/15/19 130/74   12/21/17 118/82   09/26/17 126/78    Wt Readings from Last 3 Encounters:   01/15/19 81.2 kg (179 lb)   12/21/17 79.4 kg (175 lb)   09/26/17 82 kg (180 lb 12.8 oz)                  Patient Active Problem List   Diagnosis     [email or web address]     Displacement of lumbar intervertebral disc without myelopathy     HYPERLIPIDEMIA LDL GOAL <160     Hypertension goal BP (blood pressure) < 140/90     Jock itch     Past Surgical History:   Procedure Laterality Date     COLONOSCOPY N/A 12/21/2017    Procedure: COLONOSCOPY;  colonoscopy;  Surgeon: Ej Swanson MD;  Location:  GI     HC REMOVAL OF TONSILS,<11 Y/O  1972    Tonsils <14y.o.     HEAD & NECK SURGERY      wisdom teeth     SURGICAL HISTORY OF -   2004    wisdom teeth       Social History     Tobacco Use     Smoking status: Never Smoker     Smokeless tobacco: Never Used   Substance Use Topics     Alcohol use: Yes     Alcohol/week: 0.0 oz     Comment: Social     Family History   Problem Relation Age of Onset     Diabetes Mother         type 2     Hypertension Mother      Lipids Mother      Hypertension Father      C.A.D. Father      Cerebrovascular Disease Father         Optic nerve     Hypertension Maternal Grandmother      Hypertension Maternal Grandfather      C.A.D. Paternal Grandfather      Hypertension Brother          Current Outpatient Medications   Medication Sig Dispense Refill     lisinopril (PRINIVIL/ZESTRIL) 20 MG tablet Take 1 tablet (20 mg) by mouth daily 90 tablet 3     Allergies   Allergen Reactions     No Known Allergies        Reviewed and updated as needed this visit by clinical staff  Tobacco  Allergies  Meds  Med Hx  Surg Hx  Fam Hx  Soc Hx        Reviewed and updated as needed this visit by Provider            Review of Systems  CONSTITUTIONAL: NEGATIVE for fever, chills, change in weight  INTEGUMENTARY/SKIN: NEGATIVE for worrisome rashes, moles or lesions  EYES: NEGATIVE for vision changes or irritation  ENT:  NEGATIVE for ear, mouth and throat problems  RESP: NEGATIVE for significant cough or SOB  CV: NEGATIVE for chest pain, palpitations or peripheral edema  GI: NEGATIVE for nausea, abdominal pain, heartburn, or change in bowel habits   male: negative for dysuria, hematuria, decreased urinary stream, erectile dysfunction, urethral discharge  MUSCULOSKELETAL: NEGATIVE for significant arthralgias or myalgia  NEURO: NEGATIVE for weakness, dizziness or paresthesias  PSYCHIATRIC: NEGATIVE for changes in mood or affect    OBJECTIVE:   There were no vitals taken for this visit.    Physical Exam    General Appearance: Pleasant, alert, in no acute respiratory distress.  Head Exam: Normal. Normocephalic, atraumatic. No sinus tenderness.  Eye Exam: Normal external eye, conjunctiva, lids. RUDY.  Ear Exam: Normal auditory canals and external ears. Non-tender.  Left TM-normal. Right TM-normal.  OroPharynx Exam: Dental hygiene adequate. Normal buccal mucosa. Normal pharynx.  Neck Exam: Supple, no masses or enlarged, tender nodes.  Thyroid Exam: No nodules or enlargement or tenderness.  Chest/Respiratory Exam: Normal, comfortable, easy respirations. Chest wall normal. Lungs are clear to auscultation. No wheezing, crackles, or rhonchi.  Cardiovascular Exam: Regular rate and rhythm. No murmur, gallop, or rubs. No pedal edema.  Gastrointestinal Exam: Soft, non-tender, no masses or organomegaly.  Musculoskeletal Exam: Back is non-tender, full ROM of upper and lower extremities.  Skin: no rash, warm and dry.    Neurologic Exam: Nonfocal, no tremor. Normal gait.  Psychiatric Exam: Alert - appropriate, normal affect      ASSESSMENT/PLAN:       ICD-10-CM    1. Routine history and physical examination of adult Z00.00    2. Essential hypertension with goal blood pressure less than 140/90 I10 lisinopril (PRINIVIL/ZESTRIL) 20 MG tablet     CT Coronary Calcium Scan     Basic metabolic panel     Lipid panel reflex to direct LDL Fasting   3.  "Hyperlipidemia LDL goal <160 E78.5        COUNSELING:   Reviewed preventive health counseling, as reflected in patient instructions       Regular exercise       Healthy diet/nutrition    BP Readings from Last 1 Encounters:   12/21/17 118/82     Estimated body mass index is 25.84 kg/m  as calculated from the following:    Height as of 12/21/17: 1.753 m (5' 9\").    Weight as of 12/21/17: 79.4 kg (175 lb).      Weight management plan: Discussed healthy diet and exercise guidelines     reports that  has never smoked. he has never used smokeless tobacco.      Counseling Resources:  ATP IV Guidelines  Pooled Cohorts Equation Calculator  FRAX Risk Assessment  ICSI Preventive Guidelines  Dietary Guidelines for Americans, 2010  USDA's MyPlate  ASA Prophylaxis  Lung CA Screening    Nate Todd MD  [facility]  "

## 2019-02-04 ENCOUNTER — HOSPITAL ENCOUNTER (OUTPATIENT)
Dept: CT IMAGING | Facility: CLINIC | Age: 54
Discharge: HOME | End: 2019-02-04
Attending: FAMILY MEDICINE | Admitting: FAMILY MEDICINE
Payer: COMMERCIAL

## 2019-02-04 DIAGNOSIS — I10 ESSENTIAL HYPERTENSION WITH GOAL BLOOD PRESSURE LESS THAN 140/90: ICD-10-CM

## 2019-02-04 PROCEDURE — 75571 CT HRT W/O DYE W/CA TEST: CPT | Mod: GA

## 2019-02-04 PROCEDURE — 75571 CT HRT W/O DYE W/CA TEST: CPT | Mod: 26 | Performed by: INTERNAL MEDICINE

## 2019-11-03 ENCOUNTER — HEALTH MAINTENANCE LETTER (OUTPATIENT)
Age: 54
End: 2019-11-03

## 2019-11-03 DIAGNOSIS — I10 ESSENTIAL HYPERTENSION WITH GOAL BLOOD PRESSURE LESS THAN 140/90: ICD-10-CM

## 2019-11-04 RX ORDER — LISINOPRIL 20 MG/1
TABLET ORAL
Qty: 90 TABLET | Refills: 0 | Status: SHIPPED | OUTPATIENT
Start: 2019-11-04 | End: 2020-02-01

## 2019-11-04 NOTE — TELEPHONE ENCOUNTER
Change to mail order  Prescription approved per [facility] Refill Protocol.  Iveth GIFFORD RN

## 2020-01-18 DIAGNOSIS — I10 ESSENTIAL HYPERTENSION WITH GOAL BLOOD PRESSURE LESS THAN 140/90: ICD-10-CM

## 2020-01-20 RX ORDER — LISINOPRIL 20 MG/1
TABLET ORAL
Start: 2020-01-20

## 2020-01-20 NOTE — TELEPHONE ENCOUNTER
"Too soon.  Rx sent 11/4/19 for #90.  Due for physical.  Last 1/15/19    Kim Parker RN    Requested Prescriptions   Refused Prescriptions Disp Refills     lisinopril (PRINIVIL/ZESTRIL) 20 MG tablet [Pharmacy Med Name: LISINOPRIL 20MG TABLETS]       Sig: TAKE 1 TABLET(20 MG) BY MOUTH DAILY       ACE Inhibitors (Including Combos) Protocol Failed - 1/18/2020  9:34 AM        Failed - Blood pressure under 140/90 in past 12 months     BP Readings from Last 3 Encounters:   01/15/19 130/74   12/21/17 118/82   09/26/17 126/78                 Failed - Recent (12 mo) or future (30 days) visit within the authorizing provider's specialty     Patient has had an office visit with the authorizing provider or a provider within the authorizing providers department within the previous 12 mos or has a future within next 30 days. See \"Patient Info\" tab in inbasket, or \"Choose Columns\" in Meds & Orders section of the refill encounter.              Failed - Normal serum creatinine on file in past 12 months     Recent Labs   Lab Test 01/15/19  0949   CR 0.78             Failed - Normal serum potassium on file in past 12 months     Recent Labs   Lab Test 01/15/19  0949   POTASSIUM 4.1             Passed - Medication is active on med list        Passed - Patient is age 18 or older          "

## 2020-02-01 ENCOUNTER — MYC REFILL (OUTPATIENT)
Dept: FAMILY MEDICINE | Facility: CLINIC | Age: 55
End: 2020-02-01

## 2020-02-01 DIAGNOSIS — I10 ESSENTIAL HYPERTENSION WITH GOAL BLOOD PRESSURE LESS THAN 140/90: ICD-10-CM

## 2020-02-03 RX ORDER — LISINOPRIL 20 MG/1
20 TABLET ORAL DAILY
Qty: 30 TABLET | Refills: 0 | Status: SHIPPED | OUTPATIENT
Start: 2020-02-03 | End: 2020-02-24

## 2020-02-03 NOTE — TELEPHONE ENCOUNTER
"Medication is being filled for 1 time refill only due to:  Patient needs to be seen because it has been more than one year since last visit.   Sent MyChart to pt  Iveth GIFFORD RN    Last Written Prescription Date:  11/4/2019  Last Fill Quantity: 90,  # refills: 0   Last office visit: 1/15/2019 with prescribing provider:     Future Office Visit:    Requested Prescriptions   Pending Prescriptions Disp Refills     lisinopril (PRINIVIL/ZESTRIL) 20 MG tablet 90 tablet 0     Sig: Take 1 tablet (20 mg) by mouth daily       ACE Inhibitors (Including Combos) Protocol Failed - 2/1/2020  4:46 PM        Failed - Blood pressure under 140/90 in past 12 months     BP Readings from Last 3 Encounters:   01/15/19 130/74   12/21/17 118/82   09/26/17 126/78                 Failed - Recent (12 mo) or future (30 days) visit within the authorizing provider's specialty     Patient has had an office visit with the authorizing provider or a provider within the authorizing providers department within the previous 12 mos or has a future within next 30 days. See \"Patient Info\" tab in inbasket, or \"Choose Columns\" in Meds & Orders section of the refill encounter.              Failed - Normal serum creatinine on file in past 12 months     Recent Labs   Lab Test 01/15/19  0949   CR 0.78             Failed - Normal serum potassium on file in past 12 months     Recent Labs   Lab Test 01/15/19  0949   POTASSIUM 4.1             Passed - Medication is active on med list        Passed - Patient is age 18 or older        "

## 2020-02-22 DIAGNOSIS — I10 ESSENTIAL HYPERTENSION WITH GOAL BLOOD PRESSURE LESS THAN 140/90: ICD-10-CM

## 2020-02-22 NOTE — TELEPHONE ENCOUNTER
"LISINOPRIL TAB 20MG  Last Written Prescription Date:  02/03/2020  Last Fill Quantity: 30,  # refills: 0   Last office visit: 1/15/2019 with prescribing provider:  ONEAL   Future Office Visit: 04/02/2020 WITH ONEAL  Next 5 appointments (look out 90 days)    Apr 02, 2020 10:30 AM CDT  PHYSICAL with Nate Todd MD  [facility] ([facility]) [address] [address] [phone]         Requested Prescriptions   Pending Prescriptions Disp Refills     lisinopril (ZESTRIL) 20 MG tablet [Pharmacy Med Name: LISINOPRIL TAB 20MG] 30 tablet 0     Sig: TAKE 1 TABLET DAILY,       PATIENT DUE FOR APPOINTMENT(PHYSICAL)       ACE Inhibitors (Including Combos) Protocol Failed - 2/22/2020  1:28 PM        Failed - Blood pressure under 140/90 in past 12 months     BP Readings from Last 3 Encounters:   01/15/19 130/74   12/21/17 118/82   09/26/17 126/78                 Failed - Recent (12 mo) or future (30 days) visit within the authorizing provider's specialty     Patient has had an office visit with the authorizing provider or a provider within the authorizing providers department within the previous 12 mos or has a future within next 30 days. See \"Patient Info\" tab in inbasket, or \"Choose Columns\" in Meds & Orders section of the refill encounter.              Failed - Normal serum creatinine on file in past 12 months     Recent Labs   Lab Test 01/15/19  0949   CR 0.78             Failed - Normal serum potassium on file in past 12 months     Recent Labs   Lab Test 01/15/19  0949   POTASSIUM 4.1             Passed - Medication is active on med list        Passed - Patient is age 18 or older        "

## 2020-02-24 RX ORDER — LISINOPRIL 20 MG/1
20 TABLET ORAL DAILY
Qty: 30 TABLET | Refills: 1 | Status: SHIPPED | OUTPATIENT
Start: 2020-02-24 | End: 2020-04-22

## 2020-04-22 DIAGNOSIS — I10 ESSENTIAL HYPERTENSION WITH GOAL BLOOD PRESSURE LESS THAN 140/90: ICD-10-CM

## 2020-04-22 RX ORDER — LISINOPRIL 20 MG/1
TABLET ORAL
Qty: 30 TABLET | Refills: 0 | Status: SHIPPED | OUTPATIENT
Start: 2020-04-22 | End: 2020-05-04

## 2020-04-22 NOTE — TELEPHONE ENCOUNTER
Has upcoming visit.    Next 5 appointments (look out 90 days)    May 04, 2020  1:00 PM CDT  Telephone Visit with Nate Todd MD  [facility] ([facility] [facility] [id number] [phone]        Sent 30 day supply.    Beatriz Sanon RN

## 2020-05-04 ENCOUNTER — VIRTUAL VISIT (OUTPATIENT)
Dept: FAMILY MEDICINE | Facility: CLINIC | Age: 55
End: 2020-05-04
Payer: COMMERCIAL

## 2020-05-04 DIAGNOSIS — I10 ESSENTIAL HYPERTENSION WITH GOAL BLOOD PRESSURE LESS THAN 140/90: ICD-10-CM

## 2020-05-04 DIAGNOSIS — G47.00 INSOMNIA, UNSPECIFIED TYPE: Primary | ICD-10-CM

## 2020-05-04 PROCEDURE — 99214 OFFICE O/P EST MOD 30 MIN: CPT | Mod: 95 | Performed by: PHYSICIAN ASSISTANT

## 2020-05-04 RX ORDER — LISINOPRIL 20 MG/1
20 TABLET ORAL DAILY
Qty: 90 TABLET | Refills: 3 | Status: SHIPPED | OUTPATIENT
Start: 2020-05-04 | End: 2021-05-14

## 2020-05-04 RX ORDER — ZOLPIDEM TARTRATE 10 MG/1
10 TABLET ORAL
Qty: 30 TABLET | Refills: 0 | Status: SHIPPED | OUTPATIENT
Start: 2020-05-04 | End: 2021-05-14

## 2020-05-04 NOTE — PROGRESS NOTES
"Darek De La Rosa is a 55 year old male who is being evaluated via a billable video visit.      The patient has been notified of following:     \"This video visit will be conducted via a call between you and your physician/provider. We have found that certain health care needs can be provided without the need for an in-person physical exam.  This service lets us provide the care you need with a video conversation.  If a prescription is necessary we can send it directly to your pharmacy.  If lab work is needed we can place an order for that and you can then stop by our lab to have the test done at a later time.    Video visits are billed at different rates depending on your insurance coverage.  Please reach out to your insurance provider with any questions.    If during the course of the call the physician/provider feels a video visit is not appropriate, you will not be charged for this service.\"    Patient has given verbal consent for Video visit? Yes    How would you like to obtain your AVS? E-Mail (inform patient AVS not encrypted)    Patient would like the video invitation sent by: Send to e-mail at: [email or web address]    Will anyone else be joining your video visit? No      Subjective     Darek De La Rosa is a 55 year old male who presents to clinic today for the following health issues:    HPI  Hypertension Follow-up      Do you check your blood pressure regularly outside of the clinic? No     Are you following a low salt diet? Yes    Are your blood pressures ever more than 140 on the top number (systolic) OR more   than 90 on the bottom number (diastolic), for example 140/90? No      How many servings of fruits and vegetables do you eat daily?  4 or more    On average, how many sweetened beverages do you drink each day (Examples: soda, juice, sweet tea, etc.  Do NOT count diet or artificially sweetened beverages)?   1    How many days per week do you exercise enough to make your heart beat faster? 5    How many " "minutes a day do you exercise enough to make your heart beat faster? 30 - 60    How many days per week do you miss taking your medication? 0         Video Start Time: 3:17 PM    He also has long term prn insomnia.  He has been on ambien very prn for years.  He takes maybe once a week at the most.  Works well and does not have any lingering effects in the am.      BP Readings from Last 3 Encounters:   01/15/19 130/74   12/21/17 118/82   09/26/17 126/78    Wt Readings from Last 3 Encounters:   01/15/19 81.2 kg (179 lb)   12/21/17 79.4 kg (175 lb)   09/26/17 82 kg (180 lb 12.8 oz)                    Reviewed and updated as needed this visit by Provider         Review of Systems   ROS COMP: Constitutional, HEENT, cardiovascular, pulmonary, gi and gu systems are negative, except as otherwise noted.      Objective    There were no vitals taken for this visit.  Estimated body mass index is 26.43 kg/m  as calculated from the following:    Height as of 1/15/19: 1.753 m (5' 9\").    Weight as of 1/15/19: 81.2 kg (179 lb).  Physical Exam     GENERAL: alert and no distress  EYES: Eyes grossly normal to inspection, conjunctivae and sclerae normal  RESP: no audible wheeze, cough, or visible cyanosis.  No visible retractions or increased work of breathing.  Able to speak fully in complete sentences.  NEURO: Cranial nerves grossly intact, mentation intact and speech normal  PSYCH: mentation appears normal, affect normal/bright, judgement and insight intact, normal speech and appearance well-groomed      Diagnostic Test Results:  Labs reviewed in Epic        Assessment & Plan     1. Essential hypertension with goal blood pressure less than 140/90  Doing well, has been at goal for years.  Will continue current dose.  Plans on coming in for well exam with flabs later this year.  - lisinopril (ZESTRIL) 20 MG tablet; Take 1 tablet (20 mg) by mouth daily  Dispense: 90 tablet; Refill: 3    2. Insomnia, unspecified type  Stable, responsible " use.  - zolpidem (AMBIEN) 10 MG tablet; Take 1 tablet (10 mg) by mouth nightly as needed for sleep  Dispense: 30 tablet; Refill: 0           Return in about 6 months (around 11/4/2020).    Darek Collins PA-C  [facility]      Video-Visit Details    Type of service:  Video Visit    Video End Time:3:26 PM    Originating Location (pt. Location): Home    Distant Location (provider location):  [facility]     Platform used for Video Visit: Benny    Return in about 6 months (around 11/4/2020).       Darek Collins PA-C

## 2020-11-16 ENCOUNTER — HEALTH MAINTENANCE LETTER (OUTPATIENT)
Age: 55
End: 2020-11-16

## 2021-05-04 ENCOUNTER — MYC MEDICAL ADVICE (OUTPATIENT)
Dept: FAMILY MEDICINE | Facility: CLINIC | Age: 56
End: 2021-05-04

## 2021-05-12 NOTE — PROGRESS NOTES
SUBJECTIVE:   CC: Darek De La Rosa is an 56 year old male who presents for preventative health visit.       Patient has been advised of split billing requirements and indicates understanding: Yes  Healthy Habits:     Getting at least 3 servings of Calcium per day:  Yes    Bi-annual eye exam:  NO    Dental care twice a year:  Yes    Sleep apnea or symptoms of sleep apnea:  None    Diet:  Breakfast skipped    Frequency of exercise:  2-3 days/week    Duration of exercise:  45-60 minutes    Taking medications regularly:  Yes    Medication side effects:  None    PHQ-2 Total Score: 0    Additional concerns today:  Yes          Hypertension Follow-up      Do you check your blood pressure regularly outside of the clinic? No     Are you following a low salt diet? Yes    Are your blood pressures ever more than 140 on the top number (systolic) OR more   than 90 on the bottom number (diastolic), for example 140/90? No      Today's PHQ-2 Score:   PHQ-2 ( 1999 [employer]) 5/14/2021   Q1: Little interest or pleasure in doing things 0   Q2: Feeling down, depressed or hopeless 0   PHQ-2 Score 0   Q1: Little interest or pleasure in doing things Not at all   Q2: Feeling down, depressed or hopeless Not at all   PHQ-2 Score 0       Abuse: Current or Past(Physical, Sexual or Emotional)- No  Do you feel safe in your environment? Yes    Have you ever done Advance Care Planning? (For example, a Health Directive, POLST, or a discussion with a medical provider or your loved ones about your wishes): Yes, patient states has an Advance Care Planning document and will bring a copy to the clinic.    Social History     Tobacco Use     Smoking status: Never Smoker     Smokeless tobacco: Never Used   Substance Use Topics     Alcohol use: Yes     Alcohol/week: 0.0 standard drinks     Comment: Social     If you drink alcohol do you typically have >3 drinks per day or >7 drinks per week? No    Alcohol Use 5/14/2021   Prescreen: >3 drinks/day or >7  "drinks/week? No   Prescreen: >3 drinks/day or >7 drinks/week? -   No flowsheet data found.    Last PSA: No results found for: PSA    Reviewed orders with patient. Reviewed health maintenance and updated orders accordingly - Yes  BP Readings from Last 3 Encounters:   05/14/21 126/85   01/15/19 130/74   12/21/17 118/82    Wt Readings from Last 3 Encounters:   05/14/21 80.1 kg (176 lb 9.6 oz)   01/15/19 81.2 kg (179 lb)   12/21/17 79.4 kg (175 lb)                    Reviewed and updated as needed this visit by clinical staff  Tobacco   Meds              Reviewed and updated as needed this visit by Provider                    Review of Systems  CONSTITUTIONAL: NEGATIVE for fever, chills, change in weight  INTEGUMENTARY/SKIN: NEGATIVE for worrisome rashes, moles or lesions  EYES: NEGATIVE for vision changes or irritation  ENT: NEGATIVE for ear, mouth and throat problems  RESP: NEGATIVE for significant cough or SOB  CV: NEGATIVE for chest pain, palpitations or peripheral edema  GI: NEGATIVE for nausea, abdominal pain, heartburn, or change in bowel habits   male: negative for dysuria, hematuria, decreased urinary stream, erectile dysfunction, urethral discharge  MUSCULOSKELETAL: NEGATIVE for significant arthralgias or myalgia  NEURO: NEGATIVE for weakness, dizziness or paresthesias  PSYCHIATRIC: NEGATIVE for changes in mood or affect    OBJECTIVE:   /85   Pulse 99   Resp 16   Ht 1.753 m (5' 9\")   Wt 80.1 kg (176 lb 9.6 oz)   SpO2 98%   BMI 26.08 kg/m      Physical Exam  GENERAL: alert and no distress  EYES: Eyes grossly normal to inspection, PERRL and conjunctivae and sclerae normal  HENT: ear canals and TM's normal, nose and mouth without ulcers or lesions  NECK: no adenopathy, no asymmetry, masses, or scars and thyroid normal to palpation  RESP: lungs clear to auscultation - no rales, rhonchi or wheezes  CV: regular rate and rhythm, normal S1 S2, no S3 or S4, no murmur, click or rub, no peripheral edema " "and peripheral pulses strong  ABDOMEN: soft, nontender, no hepatosplenomegaly, no masses and bowel sounds normal  MS: no gross musculoskeletal defects noted, no edema  SKIN: no suspicious lesions or rashes  NEURO: Normal strength and tone, mentation intact and speech normal  PSYCH: mentation appears normal, affect normal/bright    Diagnostic Test Results:  Labs reviewed in Epic    ASSESSMENT/PLAN:   1. Routine general medical examination at a health care facility  Doing very well  - CBC with platelets differential  - Comprehensive metabolic panel    2. Essential hypertension with goal blood pressure less than 140/90  At goal  - lisinopril (ZESTRIL) 20 MG tablet; Take 1 tablet (20 mg) by mouth daily  Dispense: 90 tablet; Refill: 3    3. Insomnia, unspecified type  Rare, prn use  - zolpidem (AMBIEN) 10 MG tablet; Take 1 tablet (10 mg) by mouth nightly as needed for sleep  Dispense: 30 tablet; Refill: 0    4. Hyperlipidemia LDL goal <160    - Lipid panel reflex to direct LDL Fasting    5. Screening PSA (prostate specific antigen)    - PSA, screen    6. Acute pain of left shoulder  Ongoing nagging injury  - PHYSICAL THERAPY REFERRAL; Future    Patient has been advised of split billing requirements and indicates understanding: Yes  COUNSELING:   Reviewed preventive health counseling, as reflected in patient instructions       Regular exercise       Healthy diet/nutrition       Colon cancer screening       Prostate cancer screening    Estimated body mass index is 26.08 kg/m  as calculated from the following:    Height as of this encounter: 1.753 m (5' 9\").    Weight as of this encounter: 80.1 kg (176 lb 9.6 oz).     Weight management plan: Discussed healthy diet and exercise guidelines    He reports that he has never smoked. He has never used smokeless tobacco.      Counseling Resources:  ATP IV Guidelines  Pooled Cohorts Equation Calculator  FRAX Risk Assessment  ICSI Preventive Guidelines  Dietary Guidelines for " Americans, 2010  USDA's MyPlate  ASA Prophylaxis  Lung CA Screening    SHUN [facility]

## 2021-05-14 ENCOUNTER — OFFICE VISIT (OUTPATIENT)
Dept: FAMILY MEDICINE | Facility: CLINIC | Age: 56
End: 2021-05-14
Payer: COMMERCIAL

## 2021-05-14 VITALS
HEART RATE: 99 BPM | SYSTOLIC BLOOD PRESSURE: 126 MMHG | OXYGEN SATURATION: 98 % | WEIGHT: 176.6 LBS | RESPIRATION RATE: 16 BRPM | HEIGHT: 69 IN | BODY MASS INDEX: 26.16 KG/M2 | DIASTOLIC BLOOD PRESSURE: 85 MMHG

## 2021-05-14 DIAGNOSIS — Z00.00 ROUTINE GENERAL MEDICAL EXAMINATION AT A HEALTH CARE FACILITY: Primary | ICD-10-CM

## 2021-05-14 DIAGNOSIS — I10 ESSENTIAL HYPERTENSION WITH GOAL BLOOD PRESSURE LESS THAN 140/90: ICD-10-CM

## 2021-05-14 DIAGNOSIS — Z12.5 SCREENING PSA (PROSTATE SPECIFIC ANTIGEN): ICD-10-CM

## 2021-05-14 DIAGNOSIS — E78.5 HYPERLIPIDEMIA LDL GOAL <160: ICD-10-CM

## 2021-05-14 DIAGNOSIS — G47.00 INSOMNIA, UNSPECIFIED TYPE: ICD-10-CM

## 2021-05-14 DIAGNOSIS — M25.512 ACUTE PAIN OF LEFT SHOULDER: ICD-10-CM

## 2021-05-14 LAB
ALBUMIN SERPL-MCNC: 4.1 G/DL (ref 3.4–5)
ALP SERPL-CCNC: 72 U/L (ref 40–150)
ALT SERPL W P-5'-P-CCNC: 38 U/L (ref 0–70)
ANION GAP SERPL CALCULATED.3IONS-SCNC: 5 MMOL/L (ref 3–14)
AST SERPL W P-5'-P-CCNC: 28 U/L (ref 0–45)
BASOPHILS # BLD AUTO: 0 10E9/L (ref 0–0.2)
BASOPHILS NFR BLD AUTO: 0.3 %
BILIRUB SERPL-MCNC: 0.6 MG/DL (ref 0.2–1.3)
BUN SERPL-MCNC: 18 MG/DL (ref 7–30)
CALCIUM SERPL-MCNC: 9.3 MG/DL (ref 8.5–10.1)
CHLORIDE SERPL-SCNC: 110 MMOL/L (ref 94–109)
CHOLEST SERPL-MCNC: 213 MG/DL
CO2 SERPL-SCNC: 25 MMOL/L (ref 20–32)
CREAT SERPL-MCNC: 0.91 MG/DL (ref 0.66–1.25)
DIFFERENTIAL METHOD BLD: NORMAL
EOSINOPHIL # BLD AUTO: 0.1 10E9/L (ref 0–0.7)
EOSINOPHIL NFR BLD AUTO: 2 %
ERYTHROCYTE [DISTWIDTH] IN BLOOD BY AUTOMATED COUNT: 12.8 % (ref 10–15)
GFR SERPL CREATININE-BSD FRML MDRD: >90 ML/MIN/{1.73_M2}
GLUCOSE SERPL-MCNC: 93 MG/DL (ref 70–99)
HCT VFR BLD AUTO: 44.7 % (ref 40–53)
HDLC SERPL-MCNC: 57 MG/DL
HGB BLD-MCNC: 14.6 G/DL (ref 13.3–17.7)
LDLC SERPL CALC-MCNC: 145 MG/DL
LYMPHOCYTES # BLD AUTO: 2.1 10E9/L (ref 0.8–5.3)
LYMPHOCYTES NFR BLD AUTO: 32.6 %
MCH RBC QN AUTO: 31.5 PG (ref 26.5–33)
MCHC RBC AUTO-ENTMCNC: 32.7 G/DL (ref 31.5–36.5)
MCV RBC AUTO: 96 FL (ref 78–100)
MONOCYTES # BLD AUTO: 0.5 10E9/L (ref 0–1.3)
MONOCYTES NFR BLD AUTO: 8.2 %
NEUTROPHILS # BLD AUTO: 3.7 10E9/L (ref 1.6–8.3)
NEUTROPHILS NFR BLD AUTO: 56.9 %
NONHDLC SERPL-MCNC: 156 MG/DL
PLATELET # BLD AUTO: 163 10E9/L (ref 150–450)
POTASSIUM SERPL-SCNC: 4.2 MMOL/L (ref 3.4–5.3)
PROT SERPL-MCNC: 7.4 G/DL (ref 6.8–8.8)
PSA SERPL-ACNC: 0.72 UG/L (ref 0–4)
RBC # BLD AUTO: 4.64 10E12/L (ref 4.4–5.9)
SODIUM SERPL-SCNC: 140 MMOL/L (ref 133–144)
TRIGL SERPL-MCNC: 54 MG/DL
WBC # BLD AUTO: 6.5 10E9/L (ref 4–11)

## 2021-05-14 PROCEDURE — G0103 PSA SCREENING: HCPCS | Performed by: PHYSICIAN ASSISTANT

## 2021-05-14 PROCEDURE — 80053 COMPREHEN METABOLIC PANEL: CPT | Performed by: PHYSICIAN ASSISTANT

## 2021-05-14 PROCEDURE — 80061 LIPID PANEL: CPT | Performed by: PHYSICIAN ASSISTANT

## 2021-05-14 PROCEDURE — 99396 PREV VISIT EST AGE 40-64: CPT | Performed by: PHYSICIAN ASSISTANT

## 2021-05-14 PROCEDURE — 36415 COLL VENOUS BLD VENIPUNCTURE: CPT | Performed by: PHYSICIAN ASSISTANT

## 2021-05-14 PROCEDURE — 85025 COMPLETE CBC W/AUTO DIFF WBC: CPT | Performed by: PHYSICIAN ASSISTANT

## 2021-05-14 RX ORDER — ZOLPIDEM TARTRATE 10 MG/1
10 TABLET ORAL
Qty: 30 TABLET | Refills: 0 | Status: SHIPPED | OUTPATIENT
Start: 2021-05-14 | End: 2021-10-18

## 2021-05-14 RX ORDER — LISINOPRIL 20 MG/1
20 TABLET ORAL DAILY
Qty: 90 TABLET | Refills: 3 | Status: SHIPPED | OUTPATIENT
Start: 2021-05-14 | End: 2022-06-13

## 2021-05-14 ASSESSMENT — MIFFLIN-ST. JEOR: SCORE: 1631.43

## 2021-05-17 NOTE — RESULT ENCOUNTER NOTE
Dear Darek    Your test results are attached, feel free to contact me via [employer]    Nice improvement in your cholesterol.  Keep up the good work!  Everything else looks great    Jaylan Collins PA-C

## 2021-05-28 ENCOUNTER — MYC MEDICAL ADVICE (OUTPATIENT)
Dept: FAMILY MEDICINE | Facility: CLINIC | Age: 56
End: 2021-05-28

## 2021-06-10 NOTE — TELEPHONE ENCOUNTER
DIAGNOSIS: L shoulder pain (> 4 mth)/ self/ no imaging   APPOINTMENT DATE: 6.15.21   NOTES STATUS DETAILS   OFFICE NOTE from referring provider N/A    OFFICE NOTE from other specialist Internal 5.14.21 [facility]   DISCHARGE SUMMARY from hospital N/A    DISCHARGE REPORT from the ER N/A    OPERATIVE REPORT N/A    EMG report N/A    MEDICATION LIST Internal    MRI N/A    DEXA (osteoporosis/bone health) N/A    CT SCAN N/A    XRAYS (IMAGES & REPORTS) N/A

## 2021-06-14 DIAGNOSIS — G89.29 CHRONIC LEFT SHOULDER PAIN: Primary | ICD-10-CM

## 2021-06-14 DIAGNOSIS — M25.512 CHRONIC LEFT SHOULDER PAIN: Primary | ICD-10-CM

## 2021-06-15 ENCOUNTER — OFFICE VISIT (OUTPATIENT)
Dept: ORTHOPEDICS | Facility: CLINIC | Age: 56
End: 2021-06-15
Payer: COMMERCIAL

## 2021-06-15 ENCOUNTER — PRE VISIT (OUTPATIENT)
Dept: ORTHOPEDICS | Facility: CLINIC | Age: 56
End: 2021-06-15

## 2021-06-15 ENCOUNTER — ANCILLARY PROCEDURE (OUTPATIENT)
Dept: GENERAL RADIOLOGY | Facility: CLINIC | Age: 56
End: 2021-06-15
Payer: COMMERCIAL

## 2021-06-15 VITALS — HEIGHT: 69 IN | WEIGHT: 175 LBS | BODY MASS INDEX: 25.92 KG/M2

## 2021-06-15 DIAGNOSIS — G89.29 CHRONIC LEFT SHOULDER PAIN: ICD-10-CM

## 2021-06-15 DIAGNOSIS — M25.512 CHRONIC LEFT SHOULDER PAIN: ICD-10-CM

## 2021-06-15 DIAGNOSIS — M75.82 TENDINITIS OF LEFT ROTATOR CUFF: Primary | ICD-10-CM

## 2021-06-15 PROCEDURE — 73030 X-RAY EXAM OF SHOULDER: CPT | Mod: LT | Performed by: RADIOLOGY

## 2021-06-15 PROCEDURE — 99203 OFFICE O/P NEW LOW 30 MIN: CPT | Performed by: FAMILY MEDICINE

## 2021-06-15 ASSESSMENT — MIFFLIN-ST. JEOR: SCORE: 1624.17

## 2021-06-15 NOTE — PROGRESS NOTES
"Sports Medicine Clinic Visit    PCP: Darek Collins    Darek De La Rosa is a 56 year old male who is seen  as self referral presenting with L shoulder pain.  Bothersome with overhead reaching or and outstretched arm over the last 4 months.  He can think of no particular injury.  He works out with a  and does work outside machines.  It can bother him with overhead presses.  She denies a past history of shoulder injuries.    Injury: about 4 months ago started to notice L shoulder pain.  This would hinder his ability to perform certain exercises.     Has not yet scheduled  PT.    Location of Pain: left shoulder - posterior RC insertion  Duration of Pain: 5 month(s)  Rating of Pain: 3/10  Pain is better with: Rest  Pain is worse with: certain exercises, sleeping on his L shoulder  Additional Features: no numbness/tingling  Treatment so far consists of: Nothing  Prior History of related problems: no L shoulder pain in the past    Ht 1.753 m (5' 9\")   Wt 79.4 kg (175 lb)   BMI 25.84 kg/m      PMH:  Past Medical History:   Diagnosis Date     Displacement of lumbar intervertebral disc without myelopathy 2/07     Hypertension      Pure hypercholesterolemia        Active problem list:  Patient Active Problem List   Diagnosis     [email or web address]     Displacement of lumbar intervertebral disc without myelopathy     HYPERLIPIDEMIA LDL GOAL <160     Hypertension goal BP (blood pressure) < 140/90     Jock itch       FH:  Family History   Problem Relation Age of Onset     Diabetes Mother         type 2     Hypertension Mother      Lipids Mother      Hypertension Father      C.A.D. Father      Cerebrovascular Disease Father         Optic nerve     Hypertension Maternal Grandmother      Hypertension Maternal Grandfather      C.A.D. Paternal Grandfather      Hypertension Brother      Hypertension Brother        SH:  Social History     Socioeconomic History     Marital status:      Spouse name: Damion John "     Number of children: 0     Years of education: 6+     Highest education level: Not on file   Occupational History     Occupation:      Employer: [employer]   Social Needs     Financial resource strain: Not on file     Food insecurity     Worry: Not on file     Inability: Not on file     Transportation needs     Medical: Not on file     Non-medical: Not on file   Tobacco Use     Smoking status: Never Smoker     Smokeless tobacco: Never Used   Substance and Sexual Activity     Alcohol use: Yes     Alcohol/week: 0.0 standard drinks     Comment: Social     Drug use: No     Sexual activity: Yes     Partners: Male   Lifestyle     Physical activity     Days per week: Not on file     Minutes per session: Not on file     Stress: Not on file   Relationships     Social connections     Talks on phone: Not on file     Gets together: Not on file     Attends Confucianism service: Not on file     Active member of club or organization: Not on file     Attends meetings of clubs or organizations: Not on file     Relationship status: Not on file     Intimate partner violence     Fear of current or ex partner: Not on file     Emotionally abused: Not on file     Physically abused: Not on file     Forced sexual activity: Not on file   Other Topics Concern     Parent/sibling w/ CABG, MI or angioplasty before 65F 55M? No   Social History Narrative    Social Documentation:        Balanced Diet: YES    Calcium intake: 3 per day    Caffeine: Maybe 1 per day    Exercise:  type of activity running;  20 miles per week     Sunscreen: No - discussed    Seatbelts:  Yes    Self Testicular Exam: No - discussed    Physical/Emotional/Sexual Abuse: No -     Do you feel safe in your environment? Yes        Cholesterol screen up to date: Yes 07/31/07    Eye Exam up to date: No - discussed optometry exam    Dental Exam up to date: Yes    Dexa Scan up to date: Does Not Apply    Colonoscopy up to date: Does Not Apply    Immunizations up to  date: Yes- TDAP- 2004-     Glucose screen if over 40:  Yes 07/31/07        Guillermina Mojica MA    07/31/07       MEDS:  See EMR, reviewed  ALL:  See EMR, reviewed    REVIEW OF SYSTEMS:  CONSTITUTIONAL:NEGATIVE for fever, chills, change in weight  INTEGUMENTARY/SKIN: NEGATIVE for worrisome rashes, moles or lesions  EYES: NEGATIVE for vision changes or irritation  ENT/MOUTH: NEGATIVE for ear, mouth and throat problems  RESP:NEGATIVE for significant cough or SOB  BREAST: NEGATIVE for masses, tenderness or discharge  CV: NEGATIVE for chest pain, palpitations or peripheral edema  GI: NEGATIVE for nausea, abdominal pain, heartburn, or change in bowel habits  :NEGATIVE for frequency, dysuria, or hematuria  :NEGATIVE for frequency, dysuria, or hematuria  NEURO: NEGATIVE for weakness, dizziness or paresthesias  ENDOCRINE: NEGATIVE for temperature intolerance, skin/hair changes  HEME/ALLERGY/IMMUNE: NEGATIVE for bleeding problems  PSYCHIATRIC: NEGATIVE for changes in mood or affect        Palpation:  T/t ant rot cuff  Tender:    Nontender:  SC joint, clavicle, AC joint, acromion,  proximal bicep tendon    Range of Motion:        Active:all normal        Passive: all normal    Strength: rotator cuff--deltoid strength full; supraspinatus strength full; infraspinatus strength full; subscapularis strength full    Special tests: + Neer's test, + Jones, Negative Cross-Arm adduction, Negative Anterior Apprehension, Negative Posterior Apprehension, Negative Sulcus Sign, Negative Austin's    Sulcus sign negative.  Load and shift maneuver negative    Scapular symmetry:  Improvements to be made in symmetry of stabilized movement    Head-fwd, shoulder-fwd posture:  Positive tendency     Distal pulses intact.  Sensation intact.  Skin intact.      We went over x-rays of the shoulder that show no glenohumeral DJD and a type I-II acromion with no significant AC joint DJD        Assessment left shoulder impingement x4 months, suspect  rotator cuff tendinitis    Plan: He would like to start with a physical therapy protocol for the posterior shoulder.  We discussed alterations he could make in the weight room as he continues with physical therapy.  We discussed cortisone shots as a pain control measure if not improving.  He knows that nonsteroidal anti-inflammatories do not heal this problem.  He can use Tylenol or nonsteroidal anti-inflammatories for pain control if needed.  Follow-up if not improving.

## 2021-06-15 NOTE — LETTER
"  6/15/2021      RE: Darek De La Rosa  [address]  [facility] [address]       Sports Medicine Clinic Visit    PCP: Darek Collins    Darek De La Rosa is a 56 year old male who is seen  as self referral presenting with L shoulder pain.  Bothersome with overhead reaching or and outstretched arm over the last 4 months.  He can think of no particular injury.  He works out with a  and does work outside machines.  It can bother him with overhead presses.  She denies a past history of shoulder injuries.    Injury: about 4 months ago started to notice L shoulder pain.  This would hinder his ability to perform certain exercises.     Has not yet scheduled  PT.    Location of Pain: left shoulder - posterior RC insertion  Duration of Pain: 5 month(s)  Rating of Pain: 3/10  Pain is better with: Rest  Pain is worse with: certain exercises, sleeping on his L shoulder  Additional Features: no numbness/tingling  Treatment so far consists of: Nothing  Prior History of related problems: no L shoulder pain in the past    Ht 1.753 m (5' 9\")   Wt 79.4 kg (175 lb)   BMI 25.84 kg/m      PMH:  Past Medical History:   Diagnosis Date     Displacement of lumbar intervertebral disc without myelopathy 2/07     Hypertension      Pure hypercholesterolemia        Active problem list:  Patient Active Problem List   Diagnosis     iamLUMBAGO     Displacement of lumbar intervertebral disc without myelopathy     HYPERLIPIDEMIA LDL GOAL <160     Hypertension goal BP (blood pressure) < 140/90     Jock itch       FH:  Family History   Problem Relation Age of Onset     Diabetes Mother         type 2     Hypertension Mother      Lipids Mother      Hypertension Father      C.A.D. Father      Cerebrovascular Disease Father         Optic nerve     Hypertension Maternal Grandmother      Hypertension Maternal Grandfather      C.A.D. Paternal Grandfather      Hypertension Brother      Hypertension Brother        SH:  Social " History     Socioeconomic History     Marital status:      Spouse name: Damion Lopez     Number of children: 0     Years of education: 6+     Highest education level: Not on file   Occupational History     Occupation:      Employer: [employer]   Social Needs     Financial resource strain: Not on file     Food insecurity     Worry: Not on file     Inability: Not on file     Transportation needs     Medical: Not on file     Non-medical: Not on file   Tobacco Use     Smoking status: Never Smoker     Smokeless tobacco: Never Used   Substance and Sexual Activity     Alcohol use: Yes     Alcohol/week: 0.0 standard drinks     Comment: Social     Drug use: No     Sexual activity: Yes     Partners: Male   Lifestyle     Physical activity     Days per week: Not on file     Minutes per session: Not on file     Stress: Not on file   Relationships     Social connections     Talks on phone: Not on file     Gets together: Not on file     Attends Restoration service: Not on file     Active member of club or organization: Not on file     Attends meetings of clubs or organizations: Not on file     Relationship status: Not on file     Intimate partner violence     Fear of current or ex partner: Not on file     Emotionally abused: Not on file     Physically abused: Not on file     Forced sexual activity: Not on file   Other Topics Concern     Parent/sibling w/ CABG, MI or angioplasty before 65F 55M? No   Social History Narrative    Social Documentation:        Balanced Diet: YES    Calcium intake: 3 per day    Caffeine: Maybe 1 per day    Exercise:  type of activity running;  20 miles per week     Sunscreen: No - discussed    Seatbelts:  Yes    Self Testicular Exam: No - discussed    Physical/Emotional/Sexual Abuse: No -     Do you feel safe in your environment? Yes        Cholesterol screen up to date: Yes 07/31/07    Eye Exam up to date: No - discussed optometry exam    Dental Exam up to date: Yes    Dexa Scan  up to date: Does Not Apply    Colonoscopy up to date: Does Not Apply    Immunizations up to date: Yes- TDAP- 2004-     Glucose screen if over 40:  Yes 07/31/07        Guillermina Mojica MA    07/31/07       MEDS:  See EMR, reviewed  ALL:  See EMR, reviewed    REVIEW OF SYSTEMS:  CONSTITUTIONAL:NEGATIVE for fever, chills, change in weight  INTEGUMENTARY/SKIN: NEGATIVE for worrisome rashes, moles or lesions  EYES: NEGATIVE for vision changes or irritation  ENT/MOUTH: NEGATIVE for ear, mouth and throat problems  RESP:NEGATIVE for significant cough or SOB  BREAST: NEGATIVE for masses, tenderness or discharge  CV: NEGATIVE for chest pain, palpitations or peripheral edema  GI: NEGATIVE for nausea, abdominal pain, heartburn, or change in bowel habits  :NEGATIVE for frequency, dysuria, or hematuria  :NEGATIVE for frequency, dysuria, or hematuria  NEURO: NEGATIVE for weakness, dizziness or paresthesias  ENDOCRINE: NEGATIVE for temperature intolerance, skin/hair changes  HEME/ALLERGY/IMMUNE: NEGATIVE for bleeding problems  PSYCHIATRIC: NEGATIVE for changes in mood or affect        Palpation:  T/t ant rot cuff  Tender:    Nontender:  SC joint, clavicle, AC joint, acromion,  proximal bicep tendon    Range of Motion:        Active:all normal        Passive: all normal    Strength: rotator cuff--deltoid strength full; supraspinatus strength full; infraspinatus strength full; subscapularis strength full    Special tests: + Neer's test, + Jones, Negative Cross-Arm adduction, Negative Anterior Apprehension, Negative Posterior Apprehension, Negative Sulcus Sign, Negative Austin's    Sulcus sign negative.  Load and shift maneuver negative    Scapular symmetry:  Improvements to be made in symmetry of stabilized movement    Head-fwd, shoulder-fwd posture:  Positive tendency     Distal pulses intact.  Sensation intact.  Skin intact.      We went over x-rays of the shoulder that show no glenohumeral DJD and a type I-II acromion with  no significant AC joint DJD        Assessment left shoulder impingement x4 months, suspect rotator cuff tendinitis    Plan: He would like to start with a physical therapy protocol for the posterior shoulder.  We discussed alterations he could make in the weight room as he continues with physical therapy.  We discussed cortisone shots as a pain control measure if not improving.  He knows that nonsteroidal anti-inflammatories do not heal this problem.  He can use Tylenol or nonsteroidal anti-inflammatories for pain control if needed.  Follow-up if not improving.        Rommel Hernandez MD

## 2021-06-16 ENCOUNTER — THERAPY VISIT (OUTPATIENT)
Dept: PHYSICAL THERAPY | Facility: CLINIC | Age: 56
End: 2021-06-16
Attending: FAMILY MEDICINE
Payer: COMMERCIAL

## 2021-06-16 DIAGNOSIS — G89.29 CHRONIC LEFT SHOULDER PAIN: ICD-10-CM

## 2021-06-16 DIAGNOSIS — M75.82 TENDINITIS OF LEFT ROTATOR CUFF: ICD-10-CM

## 2021-06-16 DIAGNOSIS — M25.512 CHRONIC LEFT SHOULDER PAIN: ICD-10-CM

## 2021-06-16 PROCEDURE — 97112 NEUROMUSCULAR REEDUCATION: CPT | Mod: GP | Performed by: PHYSICAL THERAPIST

## 2021-06-16 PROCEDURE — 97161 PT EVAL LOW COMPLEX 20 MIN: CPT | Mod: GP | Performed by: PHYSICAL THERAPIST

## 2021-06-16 PROCEDURE — 97110 THERAPEUTIC EXERCISES: CPT | Mod: GP | Performed by: PHYSICAL THERAPIST

## 2021-06-16 NOTE — PROGRESS NOTES
[facility] Initial Evaluation     Present: no    Subjective:  Darek De La Rosa is a 56 year old male with a left shoulder condition. Pt reports that he's been having left shoulder pain now for 5 months now without known cause. Works with  regularly, recently lost motion with dumbell flyes, barbell, squats, and has trouble with sleeping. Denies vague symptoms. Would like to be able to lift weights, return to PLOF pain free.      Symptoms commenced as a result of: unsure/overuse. Condition occurred in the following environment: overtime. Onset of symptoms: 4 months. Location of symptoms: posterior RC region left shoulder and at times into the left scapular region. Pain level on number scale: 4/10. Quality of pain: sharp/dull. Associated symptoms: none. Pain frequency (constant/intermittent): intermittent. Symptoms are exacerbated by: sleeping, flyes, back squat, reaching, dressing. Symptoms are relieved by: avoidance. Progression of symptoms since onset (same/better/worse): worse. Special tests (x-ray, MRI, CT scan, EMG, bone scan): none. Previous treatment: none. Improvement with previous treatment: none. General health as reported by patient is good. Pertinent medical history includes: See Epic. Medical allergies: see Epic. Other pertinent surgeries: see Epic. Current medications: See Epic. Occupation: marketing. Patient is (working in normal job without restrictions/working in normal job with restrictions/working in an alternate job/not working due to present treatment problem): normal. Primary job tasks: sitting, computer work. Barriers at home/work: None reported by patient. Red flags: None reported by patient.    Objective  Posture: forward head, rounded shoulders    Scapular positioning: slight winging left scap with eccentric flexion(abduction NT due to limited range)    Screening: negative    Flexibility: posterior capsule tightness left    Shoulder AROM (* =  pain) Right Left           175*    120*   ER 70 65   IR T7 T7*     Shoulder PROM (* = pain) Right Left    165*    125*   ER In 90 abduction 90 In 90 abduction 65*   IR In 90 abduction 70 In 90 abduction 60*     Shoulder Strength (* = pain) Right Left   FL 5/5 5-/5   ABD 5/5 5-/5*   ER 5/5 5/5   IR 5/5 5/5   Biceps 5/5 5/5   Middle Trapezius 5-/5 4/5*   Lower Trapezius 4+/5 3+/5*     Special Tests Right Left   Jones-Alon - +   Neer - +   Bear Hug     Apprehension     External Rotation Lag      Speed's     Biceps Load I     Biceps Load II     Sulcus Sign     AC Crossover     Empty Can     IR Lift-off Sign     Painful Arc - +     Palpation tenderness: unremarkable    Accessory Motion: GH L normal, GH R normal    Other Tests: ER90 L4/5* R5/5    Assessment/Plan:    Patient is a 56 year old male with left side shoulder complaints.    Patient has the following significant findings with corresponding treatment plan.                Diagnosis 1:  Left Shoulder pain, impingement left shoulder(with some posterior pain)  Pain -  manual therapy, self management, education and home program  Decreased ROM/flexibility - manual therapy and therapeutic exercise  Decreased joint mobility - manual therapy and therapeutic exercise  Decreased strength - therapeutic exercise and therapeutic activities  Impaired muscle performance - neuro re-education  Decreased function - therapeutic activities  Impaired posture - neuro re-education    Therapy Evaluation Codes:   1) History comprised of:   Personal factors that impact the plan of care:      None.    Comorbidity factors that impact the plan of care are:      None.     Medications impacting care: None.  2) Examination of Body Systems comprised of:   Body structures and functions that impact the plan of care:      Shoulder.   Activity limitations that impact the plan of care are:      Dressing, Lifting, Sports, Sleeping and Laying down.  3) Clinical presentation  characteristics are:   Stable/Uncomplicated.  4) Decision-Making    Low complexity using standardized patient assessment instrument and/or measureable assessment of functional outcome.  Cumulative Therapy Evaluation is: Low complexity.    Previous and current functional limitations:  (See Goal Flow Sheet for this information)    Short term and Long term goals: (See Goal Flow Sheet for this information)     Communication ability:  Patient appears to be able to clearly communicate and understand verbal and written communication and follow directions correctly.  Treatment Explanation - The following has been discussed with the patient:   RX ordered/plan of care  Anticipated outcomes  Possible risks and side effects  This patient would benefit from PT intervention to resume normal activities.   Rehab potential is good.    Frequency:  1 X week, once daily  Duration:  for 8 weeks  Discharge Plan:  Achieve all LTG.  Independent in home treatment program.  Reach maximal therapeutic benefit.    Please refer to the daily flowsheet for treatment today, total treatment time and time spent performing 1:1 timed codes.     Please Contact me with any questions or concerns. Thank you for for patience and cooperation.     Chuy Abel PT, DPT, CSCS  Physical Therapist  [facility] [place]  [phone]

## 2021-07-30 PROBLEM — G89.29 CHRONIC LEFT SHOULDER PAIN: Status: RESOLVED | Noted: 2021-06-16 | Resolved: 2021-07-30

## 2021-07-30 PROBLEM — M25.512 CHRONIC LEFT SHOULDER PAIN: Status: RESOLVED | Noted: 2021-06-16 | Resolved: 2021-07-30

## 2021-07-30 NOTE — PROGRESS NOTES
Patient seen for one time evaluation and treatment.  Patient did not return for further treatment and current status is unknown.  Please see initial evaluation for further information.    Please Contact me with any questions or concerns. Thank you for for patience and cooperation.     Chuy Abel PT, DPT, [facility]  Physical Therapist  [facility] [place]  [phone]

## 2021-09-18 ENCOUNTER — HEALTH MAINTENANCE LETTER (OUTPATIENT)
Age: 56
End: 2021-09-18

## 2021-10-18 ENCOUNTER — MYC REFILL (OUTPATIENT)
Dept: FAMILY MEDICINE | Facility: CLINIC | Age: 56
End: 2021-10-18

## 2021-10-18 DIAGNOSIS — G47.00 INSOMNIA, UNSPECIFIED TYPE: ICD-10-CM

## 2021-10-19 RX ORDER — ZOLPIDEM TARTRATE 10 MG/1
10 TABLET ORAL
Qty: 30 TABLET | Refills: 0 | Status: SHIPPED | OUTPATIENT
Start: 2021-10-19 | End: 2022-04-01

## 2021-10-19 NOTE — TELEPHONE ENCOUNTER
Routing refill request to provider for review/approval because:  Drug not on the [facility] refill protocol   Iveth GIFFORD RN

## 2022-04-01 ENCOUNTER — MYC REFILL (OUTPATIENT)
Dept: FAMILY MEDICINE | Facility: CLINIC | Age: 57
End: 2022-04-01
Payer: COMMERCIAL

## 2022-04-01 DIAGNOSIS — G47.00 INSOMNIA, UNSPECIFIED TYPE: ICD-10-CM

## 2022-04-01 RX ORDER — ZOLPIDEM TARTRATE 10 MG/1
10 TABLET ORAL
Qty: 30 TABLET | Refills: 0 | OUTPATIENT
Start: 2022-04-01

## 2022-04-01 RX ORDER — ZOLPIDEM TARTRATE 10 MG/1
10 TABLET ORAL
Qty: 30 TABLET | Refills: 0 | Status: SHIPPED | OUTPATIENT
Start: 2022-04-01 | End: 2022-07-15

## 2022-06-10 ENCOUNTER — MYC MEDICAL ADVICE (OUTPATIENT)
Dept: FAMILY MEDICINE | Facility: CLINIC | Age: 57
End: 2022-06-10
Payer: COMMERCIAL

## 2022-06-25 ENCOUNTER — HEALTH MAINTENANCE LETTER (OUTPATIENT)
Age: 57
End: 2022-06-25

## 2022-07-08 ASSESSMENT — ENCOUNTER SYMPTOMS
EYE PAIN: 0
ARTHRALGIAS: 0
JOINT SWELLING: 0
PARESTHESIAS: 0
COUGH: 0
HEADACHES: 0
MYALGIAS: 0
HEARTBURN: 0
CONSTIPATION: 0
CHILLS: 0
DIARRHEA: 0
PALPITATIONS: 0
SORE THROAT: 0
WEAKNESS: 0
NERVOUS/ANXIOUS: 0
DYSURIA: 0
HEMATOCHEZIA: 0
NAUSEA: 0
ABDOMINAL PAIN: 0
HEMATURIA: 0
FEVER: 0
DIZZINESS: 0
FREQUENCY: 0
SHORTNESS OF BREATH: 0

## 2022-07-15 ENCOUNTER — OFFICE VISIT (OUTPATIENT)
Dept: FAMILY MEDICINE | Facility: CLINIC | Age: 57
End: 2022-07-15
Payer: COMMERCIAL

## 2022-07-15 VITALS
BODY MASS INDEX: 25.77 KG/M2 | SYSTOLIC BLOOD PRESSURE: 133 MMHG | HEART RATE: 95 BPM | DIASTOLIC BLOOD PRESSURE: 87 MMHG | HEIGHT: 69 IN | TEMPERATURE: 97.1 F | RESPIRATION RATE: 18 BRPM | OXYGEN SATURATION: 99 % | WEIGHT: 174 LBS

## 2022-07-15 DIAGNOSIS — Z12.5 SCREENING PSA (PROSTATE SPECIFIC ANTIGEN): ICD-10-CM

## 2022-07-15 DIAGNOSIS — I10 ESSENTIAL HYPERTENSION WITH GOAL BLOOD PRESSURE LESS THAN 140/90: ICD-10-CM

## 2022-07-15 DIAGNOSIS — Z00.00 ROUTINE GENERAL MEDICAL EXAMINATION AT A HEALTH CARE FACILITY: Primary | ICD-10-CM

## 2022-07-15 DIAGNOSIS — G47.00 INSOMNIA, UNSPECIFIED TYPE: ICD-10-CM

## 2022-07-15 DIAGNOSIS — Z23 NEED FOR VACCINATION: ICD-10-CM

## 2022-07-15 DIAGNOSIS — Z13.220 SCREENING FOR HYPERLIPIDEMIA: ICD-10-CM

## 2022-07-15 LAB
BASOPHILS # BLD AUTO: 0 10E3/UL (ref 0–0.2)
BASOPHILS NFR BLD AUTO: 0 %
EOSINOPHIL # BLD AUTO: 0.1 10E3/UL (ref 0–0.7)
EOSINOPHIL NFR BLD AUTO: 1 %
ERYTHROCYTE [DISTWIDTH] IN BLOOD BY AUTOMATED COUNT: 12.5 % (ref 10–15)
HCT VFR BLD AUTO: 44.9 % (ref 40–53)
HGB BLD-MCNC: 15.1 G/DL (ref 13.3–17.7)
LYMPHOCYTES # BLD AUTO: 2.7 10E3/UL (ref 0.8–5.3)
LYMPHOCYTES NFR BLD AUTO: 34 %
MCH RBC QN AUTO: 32.1 PG (ref 26.5–33)
MCHC RBC AUTO-ENTMCNC: 33.6 G/DL (ref 31.5–36.5)
MCV RBC AUTO: 96 FL (ref 78–100)
MONOCYTES # BLD AUTO: 0.8 10E3/UL (ref 0–1.3)
MONOCYTES NFR BLD AUTO: 10 %
NEUTROPHILS # BLD AUTO: 4.3 10E3/UL (ref 1.6–8.3)
NEUTROPHILS NFR BLD AUTO: 54 %
PLATELET # BLD AUTO: 183 10E3/UL (ref 150–450)
RBC # BLD AUTO: 4.7 10E6/UL (ref 4.4–5.9)
WBC # BLD AUTO: 7.9 10E3/UL (ref 4–11)

## 2022-07-15 PROCEDURE — 85025 COMPLETE CBC W/AUTO DIFF WBC: CPT | Performed by: PHYSICIAN ASSISTANT

## 2022-07-15 PROCEDURE — 36415 COLL VENOUS BLD VENIPUNCTURE: CPT | Performed by: PHYSICIAN ASSISTANT

## 2022-07-15 PROCEDURE — 80053 COMPREHEN METABOLIC PANEL: CPT | Performed by: PHYSICIAN ASSISTANT

## 2022-07-15 PROCEDURE — G0103 PSA SCREENING: HCPCS | Performed by: PHYSICIAN ASSISTANT

## 2022-07-15 PROCEDURE — 99396 PREV VISIT EST AGE 40-64: CPT | Mod: 25 | Performed by: PHYSICIAN ASSISTANT

## 2022-07-15 PROCEDURE — 90471 IMMUNIZATION ADMIN: CPT | Performed by: PHYSICIAN ASSISTANT

## 2022-07-15 PROCEDURE — 80061 LIPID PANEL: CPT | Performed by: PHYSICIAN ASSISTANT

## 2022-07-15 PROCEDURE — 90715 TDAP VACCINE 7 YRS/> IM: CPT | Performed by: PHYSICIAN ASSISTANT

## 2022-07-15 RX ORDER — LISINOPRIL 20 MG/1
20 TABLET ORAL DAILY
Qty: 90 TABLET | Refills: 3 | Status: SHIPPED | OUTPATIENT
Start: 2022-07-15 | End: 2023-07-20

## 2022-07-15 RX ORDER — ZOLPIDEM TARTRATE 10 MG/1
10 TABLET ORAL
Qty: 30 TABLET | Refills: 3 | Status: SHIPPED | OUTPATIENT
Start: 2022-07-15 | End: 2023-07-20

## 2022-07-15 ASSESSMENT — ENCOUNTER SYMPTOMS
COUGH: 0
SORE THROAT: 0
MYALGIAS: 0
HEARTBURN: 0
HEADACHES: 0
CHILLS: 0
EYE PAIN: 0
NAUSEA: 0
JOINT SWELLING: 0
CONSTIPATION: 0
DIARRHEA: 0
FEVER: 0
DIZZINESS: 0
PARESTHESIAS: 0
ABDOMINAL PAIN: 0
SHORTNESS OF BREATH: 0
HEMATURIA: 0
PALPITATIONS: 0
WEAKNESS: 0
ARTHRALGIAS: 0
NERVOUS/ANXIOUS: 0
DYSURIA: 0
FREQUENCY: 0
HEMATOCHEZIA: 0

## 2022-07-15 ASSESSMENT — PAIN SCALES - GENERAL: PAINLEVEL: NO PAIN (0)

## 2022-07-15 NOTE — NURSING NOTE
Prior to immunization administration, verified patients identity using patient s name and date of birth. Please see Immunization Activity for additional information.     Screening Questionnaire for Adult Immunization    Are you sick today?   No   Do you have allergies to medications, food, a vaccine component or latex?   No   Have you ever had a serious reaction after receiving a vaccination?   No   Do you have a long-term health problem with heart, lung, kidney, or metabolic disease (e.g., diabetes), asthma, a blood disorder, no spleen, complement component deficiency, a cochlear implant, or a spinal fluid leak?  Are you on long-term aspirin therapy?   No   Do you have cancer, leukemia, HIV/AIDS, or any other immune system problem?   No   Do you have a parent, brother, or sister with an immune system problem?   No   In the past 3 months, have you taken medications that affect  your immune system, such as prednisone, other steroids, or anticancer drugs; drugs for the treatment of rheumatoid arthritis, Crohn s disease, or psoriasis; or have you had radiation treatments?   No   Have you had a seizure, or a brain or other nervous system problem?   No   During the past year, have you received a transfusion of blood or blood    products, or been given immune (gamma) globulin or antiviral drug?   No   For women: Are you pregnant or is there a chance you could become       pregnant during the next month?   No   Have you received any vaccinations in the past 4 weeks?   No     Immunization questionnaire answers were all negative.        Per orders of Dr. Collins, injection of TDaP given by Shawnee La RN. Patient instructed to remain in clinic for 15 minutes afterwards, and to report any adverse reaction to me immediately.       Screening performed by Shawnee La RN on 7/15/2022 at 9:17 AM.

## 2022-07-15 NOTE — PROGRESS NOTES
SUBJECTIVE:   CC: Darek De La Rosa is an 57 year old male who presents for preventative health visit.       Patient has been advised of split billing requirements and indicates understanding: Yes  Healthy Habits:     Getting at least 3 servings of Calcium per day:  Yes    Bi-annual eye exam:  NO    Dental care twice a year:  Yes    Sleep apnea or symptoms of sleep apnea:  Excessive snoring    Diet:  Breakfast skipped    Frequency of exercise:  4-5 days/week    Duration of exercise:  45-60 minutes    Taking medications regularly:  Yes    Medication side effects:  None    PHQ-2 Total Score: 0    Additional concerns today:  No          Hypertension Follow-up      Do you check your blood pressure regularly outside of the clinic? No     Are you following a low salt diet? No    Are your blood pressures ever more than 140 on the top number (systolic) OR more   than 90 on the bottom number (diastolic), for example 140/90? No      Today's PHQ-2 Score:   PHQ-2 ( 1999 Pfizer) 7/8/2022   Q1: Little interest or pleasure in doing things 0   Q2: Feeling down, depressed or hopeless 0   PHQ-2 Score 0   PHQ-2 Total Score (12-17 Years)- Positive if 3 or more points; Administer PHQ-A if positive -   Q1: Little interest or pleasure in doing things Not at all   Q2: Feeling down, depressed or hopeless Not at all   PHQ-2 Score 0       Abuse: Current or Past(Physical, Sexual or Emotional)- No  Do you feel safe in your environment? Yes        Social History     Tobacco Use     Smoking status: Never Smoker     Smokeless tobacco: Never Used   Substance Use Topics     Alcohol use: Yes     Comment: Social     If you drink alcohol do you typically have >3 drinks per day or >7 drinks per week? No    No flowsheet data found.    Last PSA:   PSA   Date Value Ref Range Status   05/14/2021 0.72 0 - 4 ug/L Final     Comment:     Assay Method:  Chemiluminescence using Siemens Vista analyzer       Reviewed orders with patient. Reviewed health maintenance  "and updated orders accordingly - Yes  BP Readings from Last 3 Encounters:   07/15/22 133/87   05/14/21 126/85   01/15/19 130/74    Wt Readings from Last 3 Encounters:   07/15/22 78.9 kg (174 lb)   06/15/21 79.4 kg (175 lb)   05/14/21 80.1 kg (176 lb 9.6 oz)                    Reviewed and updated as needed this visit by clinical staff   Tobacco  Allergies  Meds                Reviewed and updated as needed this visit by Provider                       Review of Systems   Constitutional: Negative for chills and fever.   HENT: Negative for congestion, ear pain, hearing loss and sore throat.    Eyes: Negative for pain and visual disturbance.   Respiratory: Negative for cough and shortness of breath.    Cardiovascular: Negative for chest pain, palpitations and peripheral edema.   Gastrointestinal: Negative for abdominal pain, constipation, diarrhea, heartburn, hematochezia and nausea.   Genitourinary: Negative for dysuria, frequency, genital sores, hematuria, impotence, penile discharge and urgency.   Musculoskeletal: Negative for arthralgias, joint swelling and myalgias.   Skin: Negative for rash.   Neurological: Negative for dizziness, weakness, headaches and paresthesias.   Psychiatric/Behavioral: Negative for mood changes. The patient is not nervous/anxious.          OBJECTIVE:   /87   Pulse 95   Temp 97.1  F (36.2  C) (Temporal)   Resp 18   Ht 1.753 m (5' 9\")   Wt 78.9 kg (174 lb)   SpO2 99%   BMI 25.70 kg/m      Physical Exam  GENERAL: alert and no distress  EYES: Eyes grossly normal to inspection, PERRL and conjunctivae and sclerae normal  HENT: ear canals and TM's normal, nose and mouth without ulcers or lesions  NECK: no adenopathy, no asymmetry, masses, or scars and thyroid normal to palpation  RESP: lungs clear to auscultation - no rales, rhonchi or wheezes  CV: regular rate and rhythm, normal S1 S2, no S3 or S4, no murmur, click or rub, no peripheral edema and peripheral pulses " "strong  ABDOMEN: soft, nontender, no hepatosplenomegaly, no masses and bowel sounds normal  MS: no gross musculoskeletal defects noted, no edema  SKIN: no suspicious lesions or rashes  NEURO: Normal strength and tone, mentation intact and speech normal  PSYCH: mentation appears normal, affect normal/bright    Diagnostic Test Results:  Labs reviewed in Epic    ASSESSMENT/PLAN:   (Z00.00) Routine general medical examination at a health care facility  (primary encounter diagnosis)  Comment: doing well  Plan: CBC with platelets and differential,         Comprehensive metabolic panel (BMP + Alb, Alk         Phos, ALT, AST, Total. Bili, TP)            (Z13.220) Screening for hyperlipidemia  Comment:   Plan: Lipid panel reflex to direct LDL Fasting            (I10) Essential hypertension with goal blood pressure less than 140/90  Comment: at goal  Plan: lisinopril (ZESTRIL) 20 MG tablet            (G47.00) Insomnia, unspecified type  Comment: stable  Plan: zolpidem (AMBIEN) 10 MG tablet            (Z12.5) Screening PSA (prostate specific antigen)  Comment:   Plan: PSA, screen            (Z23) Need for vaccination  Comment:   Plan: TDAP VACCINE (Adacel, Boostrix)  [[id number]]                  COUNSELING:   Reviewed preventive health counseling, as reflected in patient instructions       Regular exercise       Healthy diet/nutrition       Immunizations    Vaccinated for: TDAP             Colorectal cancer screening       Prostate cancer screening    Estimated body mass index is 25.7 kg/m  as calculated from the following:    Height as of this encounter: 1.753 m (5' 9\").    Weight as of this encounter: 78.9 kg (174 lb).     Weight management plan: Discussed healthy diet and exercise guidelines    He reports that he has never smoked. He has never used smokeless tobacco.      Counseling Resources:  ATP IV Guidelines  Pooled Cohorts Equation Calculator  FRAX Risk Assessment  ICSI Preventive Guidelines  Dietary Guidelines for " Americans, 2010  USDA's MyPlate  ASA Prophylaxis  Lung CA Screening    SHUN [facility]

## 2022-07-16 LAB
ALBUMIN SERPL-MCNC: 4.2 G/DL (ref 3.4–5)
ALP SERPL-CCNC: 66 U/L (ref 40–150)
ALT SERPL W P-5'-P-CCNC: 32 U/L (ref 0–70)
ANION GAP SERPL CALCULATED.3IONS-SCNC: 7 MMOL/L (ref 3–14)
AST SERPL W P-5'-P-CCNC: 20 U/L (ref 0–45)
BILIRUB SERPL-MCNC: 0.8 MG/DL (ref 0.2–1.3)
BUN SERPL-MCNC: 17 MG/DL (ref 7–30)
CALCIUM SERPL-MCNC: 9.6 MG/DL (ref 8.5–10.1)
CHLORIDE BLD-SCNC: 110 MMOL/L (ref 94–109)
CHOLEST SERPL-MCNC: 268 MG/DL
CO2 SERPL-SCNC: 22 MMOL/L (ref 20–32)
CREAT SERPL-MCNC: 0.76 MG/DL (ref 0.66–1.25)
FASTING STATUS PATIENT QL REPORTED: YES
GFR SERPL CREATININE-BSD FRML MDRD: >90 ML/MIN/1.73M2
GLUCOSE BLD-MCNC: 107 MG/DL (ref 70–99)
HDLC SERPL-MCNC: 69 MG/DL
LDLC SERPL CALC-MCNC: 182 MG/DL
NONHDLC SERPL-MCNC: 199 MG/DL
POTASSIUM BLD-SCNC: 4.2 MMOL/L (ref 3.4–5.3)
PROT SERPL-MCNC: 7.3 G/DL (ref 6.8–8.8)
PSA SERPL-MCNC: 0.64 UG/L (ref 0–4)
SODIUM SERPL-SCNC: 139 MMOL/L (ref 133–144)
TRIGL SERPL-MCNC: 84 MG/DL

## 2023-07-13 ASSESSMENT — ENCOUNTER SYMPTOMS
HEADACHES: 0
HEMATOCHEZIA: 0
NAUSEA: 0
ABDOMINAL PAIN: 0
HEARTBURN: 0
WEAKNESS: 0
HEMATURIA: 0
ARTHRALGIAS: 0
DIZZINESS: 0
COUGH: 0
FREQUENCY: 0
NERVOUS/ANXIOUS: 0
SORE THROAT: 0
MYALGIAS: 0
EYE PAIN: 0
CONSTIPATION: 0
SHORTNESS OF BREATH: 0
PARESTHESIAS: 0
PALPITATIONS: 0
DIARRHEA: 0
FEVER: 0
JOINT SWELLING: 0
DYSURIA: 0
CHILLS: 0

## 2023-07-20 ENCOUNTER — OFFICE VISIT (OUTPATIENT)
Dept: FAMILY MEDICINE | Facility: CLINIC | Age: 58
End: 2023-07-20
Payer: COMMERCIAL

## 2023-07-20 DIAGNOSIS — Z00.00 ROUTINE GENERAL MEDICAL EXAMINATION AT A HEALTH CARE FACILITY: Primary | ICD-10-CM

## 2023-07-20 DIAGNOSIS — Z13.220 SCREENING FOR HYPERLIPIDEMIA: ICD-10-CM

## 2023-07-20 DIAGNOSIS — E78.5 HYPERLIPIDEMIA LDL GOAL <160: ICD-10-CM

## 2023-07-20 DIAGNOSIS — G47.00 INSOMNIA, UNSPECIFIED TYPE: ICD-10-CM

## 2023-07-20 DIAGNOSIS — Z12.5 SCREENING PSA (PROSTATE SPECIFIC ANTIGEN): ICD-10-CM

## 2023-07-20 DIAGNOSIS — I10 ESSENTIAL HYPERTENSION WITH GOAL BLOOD PRESSURE LESS THAN 140/90: ICD-10-CM

## 2023-07-20 LAB
ALBUMIN SERPL BCG-MCNC: 4.7 G/DL (ref 3.5–5.2)
ALP SERPL-CCNC: 79 U/L (ref 40–129)
ALT SERPL W P-5'-P-CCNC: 32 U/L (ref 0–70)
ANION GAP SERPL CALCULATED.3IONS-SCNC: 4 MMOL/L (ref 7–15)
AST SERPL W P-5'-P-CCNC: 30 U/L (ref 0–45)
BILIRUB SERPL-MCNC: 0.7 MG/DL
BUN SERPL-MCNC: 15.1 MG/DL (ref 6–20)
CALCIUM SERPL-MCNC: 9.8 MG/DL (ref 8.6–10)
CHLORIDE SERPL-SCNC: 114 MMOL/L (ref 98–107)
CHOLEST SERPL-MCNC: 261 MG/DL
CREAT SERPL-MCNC: 0.91 MG/DL (ref 0.67–1.17)
DEPRECATED HCO3 PLAS-SCNC: 24 MMOL/L (ref 22–29)
GFR SERPL CREATININE-BSD FRML MDRD: >90 ML/MIN/1.73M2
GLUCOSE SERPL-MCNC: 106 MG/DL (ref 70–99)
HDLC SERPL-MCNC: 76 MG/DL
LDLC SERPL CALC-MCNC: 171 MG/DL
NONHDLC SERPL-MCNC: 185 MG/DL
POTASSIUM SERPL-SCNC: 4.3 MMOL/L (ref 3.4–5.3)
PROT SERPL-MCNC: 7.3 G/DL (ref 6.4–8.3)
PSA SERPL DL<=0.01 NG/ML-MCNC: 0.82 NG/ML (ref 0–3.5)
SODIUM SERPL-SCNC: 142 MMOL/L (ref 136–145)
TRIGL SERPL-MCNC: 69 MG/DL

## 2023-07-20 PROCEDURE — 80061 LIPID PANEL: CPT | Performed by: PHYSICIAN ASSISTANT

## 2023-07-20 PROCEDURE — 36415 COLL VENOUS BLD VENIPUNCTURE: CPT | Performed by: PHYSICIAN ASSISTANT

## 2023-07-20 PROCEDURE — G0103 PSA SCREENING: HCPCS | Performed by: PHYSICIAN ASSISTANT

## 2023-07-20 PROCEDURE — 99396 PREV VISIT EST AGE 40-64: CPT | Performed by: PHYSICIAN ASSISTANT

## 2023-07-20 PROCEDURE — 80053 COMPREHEN METABOLIC PANEL: CPT | Performed by: PHYSICIAN ASSISTANT

## 2023-07-20 RX ORDER — LISINOPRIL 20 MG/1
20 TABLET ORAL DAILY
Qty: 90 TABLET | Refills: 3 | Status: SHIPPED | OUTPATIENT
Start: 2023-07-20 | End: 2024-07-22

## 2023-07-20 RX ORDER — ZOLPIDEM TARTRATE 10 MG/1
10 TABLET ORAL
Qty: 30 TABLET | Refills: 3 | Status: SHIPPED | OUTPATIENT
Start: 2023-07-20 | End: 2024-01-24

## 2023-07-20 ASSESSMENT — ANXIETY QUESTIONNAIRES
6. BECOMING EASILY ANNOYED OR IRRITABLE: NOT AT ALL
GAD7 TOTAL SCORE: 0
1. FEELING NERVOUS, ANXIOUS, OR ON EDGE: NOT AT ALL
4. TROUBLE RELAXING: NOT AT ALL
2. NOT BEING ABLE TO STOP OR CONTROL WORRYING: NOT AT ALL
3. WORRYING TOO MUCH ABOUT DIFFERENT THINGS: NOT AT ALL
GAD7 TOTAL SCORE: 0
5. BEING SO RESTLESS THAT IT IS HARD TO SIT STILL: NOT AT ALL
IF YOU CHECKED OFF ANY PROBLEMS ON THIS QUESTIONNAIRE, HOW DIFFICULT HAVE THESE PROBLEMS MADE IT FOR YOU TO DO YOUR WORK, TAKE CARE OF THINGS AT HOME, OR GET ALONG WITH OTHER PEOPLE: NOT DIFFICULT AT ALL
7. FEELING AFRAID AS IF SOMETHING AWFUL MIGHT HAPPEN: NOT AT ALL

## 2023-07-20 ASSESSMENT — ENCOUNTER SYMPTOMS
SHORTNESS OF BREATH: 0
PALPITATIONS: 0
SORE THROAT: 0
DIARRHEA: 0
HEADACHES: 0
ARTHRALGIAS: 0
FREQUENCY: 0
WEAKNESS: 0
HEARTBURN: 0
HEMATURIA: 0
ABDOMINAL PAIN: 0
MYALGIAS: 0
HEMATOCHEZIA: 0
CONSTIPATION: 0
EYE PAIN: 0
CHILLS: 0
FEVER: 0
NAUSEA: 0
COUGH: 0
NERVOUS/ANXIOUS: 0
DIZZINESS: 0
PARESTHESIAS: 0
JOINT SWELLING: 0
DYSURIA: 0

## 2023-07-20 ASSESSMENT — PATIENT HEALTH QUESTIONNAIRE - PHQ9
10. IF YOU CHECKED OFF ANY PROBLEMS, HOW DIFFICULT HAVE THESE PROBLEMS MADE IT FOR YOU TO DO YOUR WORK, TAKE CARE OF THINGS AT HOME, OR GET ALONG WITH OTHER PEOPLE: NOT DIFFICULT AT ALL
SUM OF ALL RESPONSES TO PHQ QUESTIONS 1-9: 1
SUM OF ALL RESPONSES TO PHQ QUESTIONS 1-9: 1

## 2023-07-20 ASSESSMENT — PAIN SCALES - GENERAL: PAINLEVEL: NO PAIN (0)

## 2023-07-20 NOTE — PROGRESS NOTES
SUBJECTIVE:   CC: Jaylan is an 58 year old who presents for preventative health visit.       7/20/2023     9:38 AM   Additional Questions   Roomed by Yomaira     Healthy Habits:     Getting at least 3 servings of Calcium per day:  Yes    Bi-annual eye exam:  NO    Dental care twice a year:  Yes    Sleep apnea or symptoms of sleep apnea:  None    Diet:  Breakfast skipped    Frequency of exercise:  4-5 days/week    Duration of exercise:  45-60 minutes    Taking medications regularly:  Yes    Medication side effects:  Not applicable    Additional concerns today:  Yes  Answers for HPI/ROS submitted by the patient on 7/20/2023  If you checked off any problems, how difficult have these problems made it for you to do your work, take care of things at home, or get along with other people?: Not difficult at all  PHQ9 TOTAL SCORE: 1  CARMEN 7 TOTAL SCORE: 0        Today's PHQ-9 Score:       7/20/2023     9:36 AM   PHQ-9 SCORE   PHQ-9 Total Score MyChart 1 (Minimal depression)   PHQ-9 Total Score 1                   Social History     Tobacco Use     Smoking status: Never     Smokeless tobacco: Never   Substance Use Topics     Alcohol use: Yes     Comment: Social             7/13/2023    10:23 AM   Alcohol Use   Prescreen: >3 drinks/day or >7 drinks/week? No       Last PSA:   PSA   Date Value Ref Range Status   05/14/2021 0.72 0 - 4 ug/L Final     Comment:     Assay Method:  Chemiluminescence using Siemens Vista analyzer     Prostate Specific Antigen Screen   Date Value Ref Range Status   07/20/2023 0.82 0.00 - 3.50 ng/mL Final   07/15/2022 0.64 0.00 - 4.00 ug/L Final       Reviewed orders with patient. Reviewed health maintenance and updated orders accordingly - Yes  BP Readings from Last 3 Encounters:   07/20/23 134/78   07/15/22 133/87   05/14/21 126/85    Wt Readings from Last 3 Encounters:   07/20/23 78.2 kg (172 lb 6.4 oz)   07/15/22 78.9 kg (174 lb)   06/15/21 79.4 kg (175 lb)                    Reviewed and updated as  "needed this visit by clinical staff    Allergies  Meds              Reviewed and updated as needed this visit by Provider                     Review of Systems   Constitutional: Negative for chills and fever.   HENT: Negative for congestion, ear pain, hearing loss and sore throat.    Eyes: Negative for pain and visual disturbance.   Respiratory: Negative for cough and shortness of breath.    Cardiovascular: Negative for chest pain, palpitations and peripheral edema.   Gastrointestinal: Negative for abdominal pain, constipation, diarrhea, heartburn, hematochezia and nausea.   Genitourinary: Negative for dysuria, frequency, genital sores, hematuria, impotence, penile discharge and urgency.   Musculoskeletal: Negative for arthralgias, joint swelling and myalgias.   Skin: Negative for rash.   Neurological: Negative for dizziness, weakness, headaches and paresthesias.   Psychiatric/Behavioral: Negative for mood changes. The patient is not nervous/anxious.          OBJECTIVE:   /78   Pulse 90   Temp 97.2  F (36.2  C) (Temporal)   Resp 16   Ht 1.753 m (5' 9\")   Wt 78.2 kg (172 lb 6.4 oz)   SpO2 97%   BMI 25.46 kg/m      Physical Exam  GENERAL: alert and no distress  EYES: Eyes grossly normal to inspection  HENT: normal cephalic/atraumatic, ear canals and TM's normal, nose and mouth without ulcers or lesions, oropharynx clear and oral mucous membranes moist  NECK: no adenopathy, no asymmetry, masses, or scars and thyroid normal to palpation  RESP: lungs clear to auscultation - no rales, rhonchi or wheezes  CV: regular rate and rhythm, normal S1 S2, no S3 or S4, no murmur, click or rub, no peripheral edema and peripheral pulses strong  ABDOMEN: soft, nontender, no hepatosplenomegaly, no masses and bowel sounds normal  MS: no gross musculoskeletal defects noted, no edema  SKIN: no suspicious lesions or rashes  NEURO: Normal strength and tone, mentation intact and speech normal  PSYCH: mentation appears normal, " affect normal/bright    Diagnostic Test Results:  Labs reviewed in Epic    ASSESSMENT/PLAN:   (Z00.00) Routine general medical examination at a health care facility  (primary encounter diagnosis)  Comment:   Plan: Comprehensive metabolic panel (BMP + Alb, Alk         Phos, ALT, AST, Total. Bili, TP), Basic         metabolic panel  (Ca, Cl, CO2, Creat, Gluc, K,         Na, BUN)            (I10) Essential hypertension with goal blood pressure less than 140/90  Comment: at goal upon recheck and at home  Plan: lisinopril (ZESTRIL) 20 MG tablet            (G47.00) Insomnia, unspecified type  Comment: stable, responsible use  Plan: zolpidem (AMBIEN) 10 MG tablet            (E78.5) Hyperlipidemia LDL goal <160  Comment: did have 0 coronary calcium score 4 years ago.  Will look to recheck next year  Plan:     (Z13.220) Screening for hyperlipidemia  Comment:   Plan: Lipid panel reflex to direct LDL Non-fasting            (Z12.5) Screening PSA (prostate specific antigen)  Comment:   Plan: PSA, screen                COUNSELING:   Reviewed preventive health counseling, as reflected in patient instructions       Regular exercise       Healthy diet/nutrition        He reports that he has never smoked. He has never used smokeless tobacco.            Darek Collins PA-C  [facility]

## 2023-07-21 VITALS
WEIGHT: 172.4 LBS | TEMPERATURE: 97.2 F | HEART RATE: 90 BPM | OXYGEN SATURATION: 97 % | HEIGHT: 69 IN | SYSTOLIC BLOOD PRESSURE: 134 MMHG | BODY MASS INDEX: 25.53 KG/M2 | RESPIRATION RATE: 16 BRPM | DIASTOLIC BLOOD PRESSURE: 78 MMHG

## 2024-01-24 DIAGNOSIS — G47.00 INSOMNIA, UNSPECIFIED TYPE: ICD-10-CM

## 2024-01-24 RX ORDER — ZOLPIDEM TARTRATE 10 MG/1
10 TABLET ORAL
Qty: 30 TABLET | Refills: 1 | Status: SHIPPED | OUTPATIENT
Start: 2024-01-24 | End: 2024-07-22

## 2024-02-14 NOTE — TELEPHONE ENCOUNTER
"Requested Prescriptions   Pending Prescriptions Disp Refills     lisinopril (PRINIVIL/ZESTRIL) 20 MG tablet [Pharmacy Med Name: LISINOPRIL 20MG TABLETS] 90 tablet 3     Sig: TAKE 1 TABLET(20 MG) BY MOUTH DAILY  Last Written Prescription Date:  1/15/19  Last Fill Quantity: 90 tab,  # refills: 3   Last office visit: 1/15/2019 with prescribing provider:  Perez   Future Office Visit:           ACE Inhibitors (Including Combos) Protocol Passed - 11/3/2019  9:34 AM        Passed - Blood pressure under 140/90 in past 12 months     BP Readings from Last 3 Encounters:   01/15/19 130/74   12/21/17 118/82   09/26/17 126/78                 Passed - Recent (12 mo) or future (30 days) visit within the authorizing provider's specialty     Patient has had an office visit with the authorizing provider or a provider within the authorizing providers department within the previous 12 mos or has a future within next 30 days. See \"Patient Info\" tab in inbasket, or \"Choose Columns\" in Meds & Orders section of the refill encounter.              Passed - Medication is active on med list        Passed - Patient is age 18 or older        Passed - Normal serum creatinine on file in past 12 months     Recent Labs   Lab Test 01/15/19  0949   CR 0.78             Passed - Normal serum potassium on file in past 12 months     Recent Labs   Lab Test 01/15/19  0949   POTASSIUM 4.1                " oriented to person, place and time, normal sensation, short and long term memory intact, sensory exam intact

## 2024-03-16 NOTE — NURSING NOTE
"Chief Complaint   Patient presents with     Physical       Initial /78  Pulse 95  Temp 97.7  F (36.5  C) (Oral)  Ht 5' 9\" (1.753 m)  Wt 180 lb 12.8 oz (82 kg)  SpO2 98%  BMI 26.7 kg/m2 Estimated body mass index is 26.7 kg/(m^2) as calculated from the following:    Height as of this encounter: 5' 9\" (1.753 m).    Weight as of this encounter: 180 lb 12.8 oz (82 kg).  Medication Reconciliation: complete      Health Maintenance that is potentially due pending provider review:  Colonoscopy.    Patient will schedule colonoscopy.     GODWIN Soriano  "
pt endorsed to me by dr kearns. labs wdl. tolerating po well. return precautions discussed. will d/c home

## 2024-07-17 SDOH — HEALTH STABILITY: PHYSICAL HEALTH: ON AVERAGE, HOW MANY MINUTES DO YOU ENGAGE IN EXERCISE AT THIS LEVEL?: 60 MIN

## 2024-07-17 SDOH — HEALTH STABILITY: PHYSICAL HEALTH: ON AVERAGE, HOW MANY DAYS PER WEEK DO YOU ENGAGE IN MODERATE TO STRENUOUS EXERCISE (LIKE A BRISK WALK)?: 5 DAYS

## 2024-07-17 ASSESSMENT — ANXIETY QUESTIONNAIRES
7. FEELING AFRAID AS IF SOMETHING AWFUL MIGHT HAPPEN: NOT AT ALL
GAD7 TOTAL SCORE: 0
GAD7 TOTAL SCORE: 0
6. BECOMING EASILY ANNOYED OR IRRITABLE: NOT AT ALL
7. FEELING AFRAID AS IF SOMETHING AWFUL MIGHT HAPPEN: NOT AT ALL
5. BEING SO RESTLESS THAT IT IS HARD TO SIT STILL: NOT AT ALL
1. FEELING NERVOUS, ANXIOUS, OR ON EDGE: NOT AT ALL
GAD7 TOTAL SCORE: 0
4. TROUBLE RELAXING: NOT AT ALL
3. WORRYING TOO MUCH ABOUT DIFFERENT THINGS: NOT AT ALL
2. NOT BEING ABLE TO STOP OR CONTROL WORRYING: NOT AT ALL

## 2024-07-17 ASSESSMENT — PATIENT HEALTH QUESTIONNAIRE - PHQ9
SUM OF ALL RESPONSES TO PHQ QUESTIONS 1-9: 2
SUM OF ALL RESPONSES TO PHQ QUESTIONS 1-9: 2
10. IF YOU CHECKED OFF ANY PROBLEMS, HOW DIFFICULT HAVE THESE PROBLEMS MADE IT FOR YOU TO DO YOUR WORK, TAKE CARE OF THINGS AT HOME, OR GET ALONG WITH OTHER PEOPLE: SOMEWHAT DIFFICULT

## 2024-07-17 ASSESSMENT — SOCIAL DETERMINANTS OF HEALTH (SDOH): HOW OFTEN DO YOU GET TOGETHER WITH FRIENDS OR RELATIVES?: MORE THAN THREE TIMES A WEEK

## 2024-07-22 ENCOUNTER — OFFICE VISIT (OUTPATIENT)
Dept: FAMILY MEDICINE | Facility: CLINIC | Age: 59
End: 2024-07-22
Payer: COMMERCIAL

## 2024-07-22 VITALS
WEIGHT: 174 LBS | HEART RATE: 98 BPM | RESPIRATION RATE: 16 BRPM | HEIGHT: 68 IN | TEMPERATURE: 98.2 F | OXYGEN SATURATION: 98 % | BODY MASS INDEX: 26.37 KG/M2 | DIASTOLIC BLOOD PRESSURE: 80 MMHG | SYSTOLIC BLOOD PRESSURE: 116 MMHG

## 2024-07-22 DIAGNOSIS — Z00.00 ROUTINE GENERAL MEDICAL EXAMINATION AT A HEALTH CARE FACILITY: Primary | ICD-10-CM

## 2024-07-22 DIAGNOSIS — Z13.6 CARDIOVASCULAR SCREENING; LDL GOAL LESS THAN 160: ICD-10-CM

## 2024-07-22 DIAGNOSIS — M25.511 CHRONIC RIGHT SHOULDER PAIN: ICD-10-CM

## 2024-07-22 DIAGNOSIS — G89.29 CHRONIC RIGHT SHOULDER PAIN: ICD-10-CM

## 2024-07-22 DIAGNOSIS — I10 ESSENTIAL HYPERTENSION WITH GOAL BLOOD PRESSURE LESS THAN 140/90: ICD-10-CM

## 2024-07-22 DIAGNOSIS — Z12.5 SCREENING PSA (PROSTATE SPECIFIC ANTIGEN): ICD-10-CM

## 2024-07-22 DIAGNOSIS — B35.6 TINEA CRURIS: ICD-10-CM

## 2024-07-22 DIAGNOSIS — G47.00 INSOMNIA, UNSPECIFIED TYPE: ICD-10-CM

## 2024-07-22 LAB
ALBUMIN SERPL BCG-MCNC: 4.8 G/DL (ref 3.5–5.2)
ALP SERPL-CCNC: 66 U/L (ref 40–150)
ALT SERPL W P-5'-P-CCNC: 22 U/L (ref 0–70)
ANION GAP SERPL CALCULATED.3IONS-SCNC: 10 MMOL/L (ref 7–15)
AST SERPL W P-5'-P-CCNC: 24 U/L (ref 0–45)
BASOPHILS # BLD AUTO: 0 10E3/UL (ref 0–0.2)
BASOPHILS NFR BLD AUTO: 1 %
BILIRUB SERPL-MCNC: 0.6 MG/DL
BUN SERPL-MCNC: 16.5 MG/DL (ref 6–20)
CALCIUM SERPL-MCNC: 10.3 MG/DL (ref 8.8–10.4)
CHLORIDE SERPL-SCNC: 106 MMOL/L (ref 98–107)
CHOLEST SERPL-MCNC: 260 MG/DL
CREAT SERPL-MCNC: 0.89 MG/DL (ref 0.67–1.17)
EGFRCR SERPLBLD CKD-EPI 2021: >90 ML/MIN/1.73M2
EOSINOPHIL # BLD AUTO: 0.2 10E3/UL (ref 0–0.7)
EOSINOPHIL NFR BLD AUTO: 4 %
ERYTHROCYTE [DISTWIDTH] IN BLOOD BY AUTOMATED COUNT: 11.9 % (ref 10–15)
FASTING STATUS PATIENT QL REPORTED: YES
FASTING STATUS PATIENT QL REPORTED: YES
GLUCOSE SERPL-MCNC: 106 MG/DL (ref 70–99)
HCO3 SERPL-SCNC: 26 MMOL/L (ref 22–29)
HCT VFR BLD AUTO: 42.7 % (ref 40–53)
HDLC SERPL-MCNC: 63 MG/DL
HGB BLD-MCNC: 14.6 G/DL (ref 13.3–17.7)
IMM GRANULOCYTES # BLD: 0 10E3/UL
IMM GRANULOCYTES NFR BLD: 0 %
LDLC SERPL CALC-MCNC: 180 MG/DL
LYMPHOCYTES # BLD AUTO: 2 10E3/UL (ref 0.8–5.3)
LYMPHOCYTES NFR BLD AUTO: 34 %
MCH RBC QN AUTO: 31.9 PG (ref 26.5–33)
MCHC RBC AUTO-ENTMCNC: 34.2 G/DL (ref 31.5–36.5)
MCV RBC AUTO: 93 FL (ref 78–100)
MONOCYTES # BLD AUTO: 0.6 10E3/UL (ref 0–1.3)
MONOCYTES NFR BLD AUTO: 11 %
NEUTROPHILS # BLD AUTO: 3 10E3/UL (ref 1.6–8.3)
NEUTROPHILS NFR BLD AUTO: 51 %
NONHDLC SERPL-MCNC: 197 MG/DL
PLATELET # BLD AUTO: 173 10E3/UL (ref 150–450)
POTASSIUM SERPL-SCNC: 4.3 MMOL/L (ref 3.4–5.3)
PROT SERPL-MCNC: 7.3 G/DL (ref 6.4–8.3)
PSA SERPL DL<=0.01 NG/ML-MCNC: 0.87 NG/ML (ref 0–3.5)
RBC # BLD AUTO: 4.57 10E6/UL (ref 4.4–5.9)
SODIUM SERPL-SCNC: 142 MMOL/L (ref 135–145)
TRIGL SERPL-MCNC: 84 MG/DL
WBC # BLD AUTO: 5.9 10E3/UL (ref 4–11)

## 2024-07-22 PROCEDURE — 99396 PREV VISIT EST AGE 40-64: CPT | Performed by: PHYSICIAN ASSISTANT

## 2024-07-22 PROCEDURE — 80061 LIPID PANEL: CPT | Performed by: PHYSICIAN ASSISTANT

## 2024-07-22 PROCEDURE — 36415 COLL VENOUS BLD VENIPUNCTURE: CPT | Performed by: PHYSICIAN ASSISTANT

## 2024-07-22 PROCEDURE — 85025 COMPLETE CBC W/AUTO DIFF WBC: CPT | Performed by: PHYSICIAN ASSISTANT

## 2024-07-22 PROCEDURE — G0103 PSA SCREENING: HCPCS | Performed by: PHYSICIAN ASSISTANT

## 2024-07-22 PROCEDURE — 80053 COMPREHEN METABOLIC PANEL: CPT | Performed by: PHYSICIAN ASSISTANT

## 2024-07-22 RX ORDER — ZOLPIDEM TARTRATE 10 MG/1
10 TABLET ORAL
Qty: 30 TABLET | Refills: 4 | Status: SHIPPED | OUTPATIENT
Start: 2024-07-22

## 2024-07-22 RX ORDER — KETOCONAZOLE 20 MG/G
CREAM TOPICAL DAILY
Qty: 30 G | Refills: 1 | Status: SHIPPED | OUTPATIENT
Start: 2024-07-22

## 2024-07-22 RX ORDER — LISINOPRIL 20 MG/1
20 TABLET ORAL DAILY
Qty: 90 TABLET | Refills: 3 | Status: SHIPPED | OUTPATIENT
Start: 2024-07-22

## 2024-07-22 RX ORDER — OMEPRAZOLE 20 MG/1
20 TABLET, DELAYED RELEASE ORAL DAILY
COMMUNITY

## 2024-07-22 NOTE — PROGRESS NOTES
"Preventive Care Visit  [facility]  Darek Collins PA-C, Family Medicine  Jul 22, 2024      Assessment & Plan     Routine general medical examination at a health care facility    - CBC with Platelets & Differential; Future  - Comprehensive metabolic panel; Future  - CBC with Platelets & Differential  - Comprehensive metabolic panel    Insomnia, unspecified type  Stable, responsible use  - zolpidem (AMBIEN) 10 MG tablet; Take 1 tablet (10 mg) by mouth nightly as needed for sleep    Essential hypertension with goal blood pressure less than 140/90  At goal  - lisinopril (ZESTRIL) 20 MG tablet; Take 1 tablet (20 mg) by mouth daily    Chronic right shoulder pain    - Orthopedic  Referral; Future    CARDIOVASCULAR SCREENING; LDL GOAL LESS THAN 160    - Lipid panel reflex to direct LDL Fasting; Future  - CT Coronary Calcium Scan; Future  - Lipid panel reflex to direct LDL Fasting    Screening PSA (prostate specific antigen)    - PSA, screen; Future  - PSA, screen    Tinea cruris    - ketoconazole (NIZORAL) 2 % external cream; Apply topically daily            BMI  Estimated body mass index is 26.46 kg/m  as calculated from the following:    Height as of this encounter: 1.727 m (5' 8\").    Weight as of this encounter: 78.9 kg (174 lb).   Weight management plan: Discussed healthy diet and exercise guidelines    Counseling  Appropriate preventive services were addressed with this patient via screening, questionnaire, or discussion as appropriate for fall prevention, nutrition, physical activity, Tobacco-use cessation, weight loss and cognition.  Checklist reviewing preventive services available has been given to the patient.  Reviewed patient's diet, addressing concerns and/or questions.           Emory Tian is a 59 year old, presenting for the following:  Physical        7/22/2024     9:35 AM   Additional Questions   Roomed by Juanita COY MA   Accompanied by self         7/22/2024     " 9:35 AM   Patient Reported Additional Medications   Patient reports taking the following new medications none        Health Care Directive  Patient does not have a Health Care Directive or Living Will:     HPI        7/17/2024   General Health   How would you rate your overall physical health? Good   Feel stress (tense, anxious, or unable to sleep) To some extent      (!) STRESS CONCERN      7/17/2024   Nutrition   Three or more servings of calcium each day? Yes   Diet: Breakfast skipped   How many servings of fruit and vegetables per day? (!) 2-3   How many sweetened beverages each day? 0-1            7/17/2024   Exercise   Days per week of moderate/strenous exercise 5 days   Average minutes spent exercising at this level 60 min            7/17/2024   Social Factors   Frequency of gathering with friends or relatives More than three times a week   Worry food won't last until get money to buy more No   Food not last or not have enough money for food? No   Do you have housing? (Housing is defined as stable permanent housing and does not include staying ouside in a car, in a tent, in an abandoned building, in an overnight shelter, or couch-surfing.) Yes   Are you worried about losing your housing? No   Lack of transportation? No   Unable to get utilities (heat,electricity)? No            7/17/2024   Fall Risk   Fallen 2 or more times in the past year? No   Trouble with walking or balance? No             7/17/2024   Dental   Dentist two times every year? Yes            7/17/2024   TB Screening   Were you born outside of the [place]? No          Today's PHQ-9 Score:       7/17/2024    12:15 PM   PHQ-9 SCORE   PHQ-9 Total Score MyChart 2 (Minimal depression)   PHQ-9 Total Score 2         7/17/2024   Substance Use   Alcohol more than 3/day or more than 7/wk No   Do you use any other substances recreationally? (!) CANNABIS PRODUCTS        Social History     Tobacco Use    Smoking status: Never    Smokeless tobacco: Never  "  Substance Use Topics    Alcohol use: Yes     Comment: Social    Drug use: No           7/17/2024   STI Screening   New sexual partner(s) since last STI/HIV test? No      Last PSA:   PSA   Date Value Ref Range Status   05/14/2021 0.72 0 - 4 ug/L Final     Comment:     Assay Method:  Chemiluminescence using Siemens Vista analyzer     Prostate Specific Antigen Screen   Date Value Ref Range Status   07/20/2023 0.82 0.00 - 3.50 ng/mL Final   07/15/2022 0.64 0.00 - 4.00 ug/L Final     ASCVD Risk   The 10-year ASCVD risk score (Kay VILLANUEVA, et al., 2019) is: 6.2%    Values used to calculate the score:      Age: 59 years      Sex: Male      Is Non- : No      Diabetic: No      Tobacco smoker: No      Systolic Blood Pressure: 116 mmHg      Is BP treated: Yes      HDL Cholesterol: 76 mg/dL      Total Cholesterol: 261 mg/dL           Reviewed and updated as needed this visit by Provider                    BP Readings from Last 3 Encounters:   07/22/24 116/80   07/20/23 134/78   07/15/22 133/87    Wt Readings from Last 3 Encounters:   07/22/24 78.9 kg (174 lb)   07/20/23 78.2 kg (172 lb 6.4 oz)   07/15/22 78.9 kg (174 lb)                      Review of Systems  Constitutional, HEENT, cardiovascular, pulmonary, gi and gu systems are negative, except as otherwise noted.     Objective    Exam  /80 (BP Location: Right arm, Patient Position: Sitting, Cuff Size: Adult Regular)   Pulse 98   Temp 98.2  F (36.8  C) (Temporal)   Resp 16   Ht 1.727 m (5' 8\")   Wt 78.9 kg (174 lb)   SpO2 98%   BMI 26.46 kg/m     Estimated body mass index is 26.46 kg/m  as calculated from the following:    Height as of this encounter: 1.727 m (5' 8\").    Weight as of this encounter: 78.9 kg (174 lb).    Physical Exam  GENERAL: alert and no distress  EYES: Eyes grossly normal to inspection  HENT: ear canals and TM's normal, nose and mouth without ulcers or lesions  NECK: no adenopathy, no asymmetry, masses, or " scars  RESP: lungs clear to auscultation - no rales, rhonchi or wheezes  CV: regular rate and rhythm, normal S1 S2, no S3 or S4, no murmur, click or rub, no peripheral edema  ABDOMEN: soft, nontender, no hepatosplenomegaly, no masses and bowel sounds normal  MS: no gross musculoskeletal defects noted, no edema  SKIN: no suspicious lesions or rashes  NEURO: Normal strength and tone, mentation intact and speech normal  PSYCH: mentation appears normal, affect normal/bright        Signed Electronically by: Darek Collins PA-C    Answers submitted by the patient for this visit:  Patient Health Questionnaire (Submitted on 7/17/2024)  If you checked off any problems, how difficult have these problems made it for you to do your work, take care of things at home, or get along with other people?: Somewhat difficult  PHQ9 TOTAL SCORE: 2  CARMEN-7 (Submitted on 7/17/2024)  CARMEN 7 TOTAL SCORE: 0

## 2024-08-05 DIAGNOSIS — M25.511 PAIN IN JOINT OF RIGHT SHOULDER: Primary | ICD-10-CM

## 2024-08-07 NOTE — TELEPHONE ENCOUNTER
DIAGNOSIS: RIGHT shoulder pain / Darek Collins PA-C in [facility] / [facility] / No imaging done with this referral    APPOINTMENT DATE: 8/8/24   NOTES STATUS DETAILS   OFFICE NOTE from referring provider Internal [facility]   7/22/24 - Darek Eisenberg PA-C    OFFICE NOTE from other specialist Internal [facility] 6/16/21 - Chuy Williamson PT     [facility]:  6/15/24 - Rommel Wiggins MD    MEDICATION LIST Internal    XRAYS (IMAGES & REPORTS) Internal XR Shoulder R - Scheduled 8/8/24    XR Shoulder L - 6/15/21

## 2024-08-08 ENCOUNTER — PRE VISIT (OUTPATIENT)
Dept: ORTHOPEDICS | Facility: CLINIC | Age: 59
End: 2024-08-08

## 2024-08-08 ENCOUNTER — ANCILLARY PROCEDURE (OUTPATIENT)
Dept: GENERAL RADIOLOGY | Facility: CLINIC | Age: 59
End: 2024-08-08
Attending: FAMILY MEDICINE
Payer: COMMERCIAL

## 2024-08-08 ENCOUNTER — OFFICE VISIT (OUTPATIENT)
Dept: ORTHOPEDICS | Facility: CLINIC | Age: 59
End: 2024-08-08
Attending: PHYSICIAN ASSISTANT
Payer: COMMERCIAL

## 2024-08-08 DIAGNOSIS — M25.511 PAIN IN JOINT OF RIGHT SHOULDER: ICD-10-CM

## 2024-08-08 DIAGNOSIS — M75.01 ADHESIVE CAPSULITIS OF RIGHT SHOULDER: Primary | ICD-10-CM

## 2024-08-08 PROCEDURE — 99203 OFFICE O/P NEW LOW 30 MIN: CPT | Performed by: FAMILY MEDICINE

## 2024-08-08 PROCEDURE — 73030 X-RAY EXAM OF SHOULDER: CPT | Mod: RT | Performed by: RADIOLOGY

## 2024-08-08 NOTE — PROGRESS NOTES
Rt shoulder    Today, the patient reports that he's had right shoulder pain for at least 4 months. Denies any acute injury, but is suspecting it's from pickle ball. Pain is located over the anterior and posterior shoulder. Pain is worse with abduction. Limited by pain, but feels weak. He is able to do hammer curls, but is unable to do a chest press. He is right hand dominant. Denies going to physical therapy for the right side. He has never received any injections into the right shoulder. He is not currently taking any medication for the pain. He enjoys playing Trip[employer] ball, weight lifting, walking.     Patient seen in 2021 for left-sided rotator cuff tendinitis.  Referred to physical therapy at that time.    Patient has been a  at [employer].  Patient has worked out with a  in the past including overhead weightlifting circuit training.      PMH:  Past Medical History:   Diagnosis Date    Displacement of lumbar intervertebral disc without myelopathy 2/07    Hypertension     Pure hypercholesterolemia        Active problem list:  Patient Active Problem List   Diagnosis    iamLUMBAGO    Displacement of lumbar intervertebral disc without myelopathy    HYPERLIPIDEMIA LDL GOAL <160    Hypertension goal BP (blood pressure) < 140/90    Jock itch       FH:  Family History   Problem Relation Age of Onset    Diabetes Mother         type 2    Hypertension Mother     Lipids Mother     Hypertension Father     C.A.D. Father     Cerebrovascular Disease Father         Optic nerve    Hypertension Maternal Grandmother     Hypertension Maternal Grandfather     C.A.D. Paternal Grandfather     Hypertension Brother     Hypertension Brother        SH:  Social History     Socioeconomic History    Marital status:      Spouse name: Damion Lopez    Number of children: 0    Years of education: 6+    Highest education level: Not on file   Occupational History    Occupation:       Employer: [employer]   Tobacco Use    Smoking status: Never    Smokeless tobacco: Never   Substance and Sexual Activity    Alcohol use: Yes     Comment: Social    Drug use: No    Sexual activity: Yes     Partners: Male   Other Topics Concern    Parent/sibling w/ CABG, MI or angioplasty before 65F 55M? No   Social History Narrative    Social Documentation:        Balanced Diet: YES    Calcium intake: 3 per day    Caffeine: Maybe 1 per day    Exercise:  type of activity running;  20 miles per week     Sunscreen: No - discussed    Seatbelts:  Yes    Self Testicular Exam: No - discussed    Physical/Emotional/Sexual Abuse: No -     Do you feel safe in your environment? Yes        Cholesterol screen up to date: Yes 07/31/07    Eye Exam up to date: No - discussed optometry exam    Dental Exam up to date: Yes    Dexa Scan up to date: Does Not Apply    Colonoscopy up to date: Does Not Apply    Immunizations up to date: Yes- TDAP- 2004-     Glucose screen if over 40:  Yes 07/31/07        Guillermina Mojica MA    07/31/07     Social Determinants of Health     Financial Resource Strain: Low Risk  (7/17/2024)    Financial Resource Strain     Within the past 12 months, have you or your family members you live with been unable to get utilities (heat, electricity) when it was really needed?: No   Food Insecurity: Low Risk  (7/17/2024)    Food Insecurity     Within the past 12 months, did you worry that your food would run out before you got money to buy more?: No     Within the past 12 months, did the food you bought just not last and you didn t have money to get more?: No   Transportation Needs: Low Risk  (7/17/2024)    Transportation Needs     Within the past 12 months, has lack of transportation kept you from medical appointments, getting your medicines, non-medical meetings or appointments, work, or from getting things that you need?: No   Physical Activity: Sufficiently Active (7/17/2024)    Exercise Vital Sign     Days of  Exercise per Week: 5 days     Minutes of Exercise per Session: 60 min   Stress: Stress Concern Present (7/17/2024)    [employer] [place] [employer] - Occupational Stress Questionnaire     Feeling of Stress : To some extent   Social Connections: Unknown (7/17/2024)    Social Connection and Isolation Panel [NHANES]     Frequency of Communication with Friends and Family: Not on file     Frequency of Social Gatherings with Friends and Family: More than three times a week     Attends Rastafarian Services: Not on file     Active Member of Clubs or Organizations: Not on file     Attends Club or Organization Meetings: Not on file     Marital Status: Not on file   Interpersonal Safety: Low Risk  (7/22/2024)    Interpersonal Safety     Do you feel physically and emotionally safe where you currently live?: Yes     Within the past 12 months, have you been hit, slapped, kicked or otherwise physically hurt by someone?: No     Within the past 12 months, have you been humiliated or emotionally abused in other ways by your partner or ex-partner?: No   Housing Stability: Low Risk  (7/17/2024)    Housing Stability     Do you have housing? : Yes     Are you worried about losing your housing?: No       MEDS:  See EMR, reviewed  ALL:  See EMR, reviewed    REVIEW OF SYSTEMS:  CONSTITUTIONAL:NEGATIVE for fever, chills, change in weight  INTEGUMENTARY/SKIN: NEGATIVE for worrisome rashes, moles or lesions  EYES: NEGATIVE for vision changes or irritation  ENT/MOUTH: NEGATIVE for ear, mouth and throat problems  RESP:NEGATIVE for significant cough or SOB  BREAST: NEGATIVE for masses, tenderness or discharge  CV: NEGATIVE for chest pain, palpitations or peripheral edema  GI: NEGATIVE for nausea, abdominal pain, heartburn, or change in bowel habits  :NEGATIVE for frequency, dysuria, or hematuria  :NEGATIVE for frequency, dysuria, or hematuria  NEURO: NEGATIVE for weakness, dizziness or paresthesias  ENDOCRINE: NEGATIVE for  temperature intolerance, skin/hair changes  HEME/ALLERGY/IMMUNE: NEGATIVE for bleeding problems  PSYCHIATRIC: NEGATIVE for changes in mood or affect      Objective: In the supine position the right shoulder has a 60 degree allowed internal to external rotation arc, compared to 180 degrees with the nonaffected left shoulder.  In the upright position he has 5 out of 5 strength bilaterally at deltoid, supraspinatus, infraspinatus and subscapularis with no weakness noted.  The right shoulder reveals no effusion.  Overlying skin is normal.  Nontender at the AC joint, anterior cuff or biceps tendon of the right shoulder.  No swelling in the right upper extremity.  Sensation is intact distally.  Appropriate in conversation and affect.    Personally viewed with the patient x-rays of the right shoulder that show no significant glenohumeral DJD or bony abnormality.      Assessment: Right-sided shoulder adhesive capsulitis, symptoms x 3 months    Plan: We discussed that on average frozen shoulder takes 9 to 16 months to resolve.  He understands that cortisone shots into the glenohumeral joint can be used to help with discomfort but do not shorten the course of frozen shoulder.  We discussed physical therapy options.  He declines the need for a cortisone shot at this time.  He preferred a set of handouts of home range of motion exercises for frozen shoulder.  Informational handout and exercise sheet provided.  He will look for slow improvements over the upcoming months.  He had no further questions and will follow-up as needed.

## 2024-08-08 NOTE — LETTER
8/8/2024      RE: Darek De La Rosa  [address]  [place] [address]     Dear Colleague,    Thank you for referring your patient, Darek De La Rosa, to the [facility] [place]. Please see a copy of my visit note below.    Rt shoulder    Today, the patient reports that he's had right shoulder pain for at least 4 months. Denies any acute injury, but is suspecting it's from pickle ball. Pain is located over the anterior and posterior shoulder. Pain is worse with abduction. Limited by pain, but feels weak. He is able to do hammer curls, but is unable to do a chest press. He is right hand dominant. Denies going to physical therapy for the right side. He has never received any injections into the right shoulder. He is not currently taking any medication for the pain. He enjoys playing [employer] ball, weight lifting, walking.     Patient seen in 2021 for left-sided rotator cuff tendinitis.  Referred to physical therapy at that time.    Patient has been a  at [employer].  Patient has worked out with a  in the past including overhead weightlifting circuit training.      PMH:  Past Medical History:   Diagnosis Date     Displacement of lumbar intervertebral disc without myelopathy 2/07     Hypertension      Pure hypercholesterolemia        Active problem list:  Patient Active Problem List   Diagnosis     iamLUMBAGO     Displacement of lumbar intervertebral disc without myelopathy     HYPERLIPIDEMIA LDL GOAL <160     Hypertension goal BP (blood pressure) < 140/90     Jock itch       FH:  Family History   Problem Relation Age of Onset     Diabetes Mother         type 2     Hypertension Mother      Lipids Mother      Hypertension Father      C.A.D. Father      Cerebrovascular Disease Father         Optic nerve     Hypertension Maternal Grandmother      Hypertension Maternal Grandfather      C.A.D. Paternal Grandfather      Hypertension Brother       Hypertension Brother        SH:  Social History     Socioeconomic History     Marital status:      Spouse name: Damion Lopez     Number of children: 0     Years of education: 6+     Highest education level: Not on file   Occupational History     Occupation:      Employer: [employer]   Tobacco Use     Smoking status: Never     Smokeless tobacco: Never   Substance and Sexual Activity     Alcohol use: Yes     Comment: Social     Drug use: No     Sexual activity: Yes     Partners: Male   Other Topics Concern     Parent/sibling w/ CABG, MI or angioplasty before 65F 55M? No   Social History Narrative    Social Documentation:        Balanced Diet: YES    Calcium intake: 3 per day    Caffeine: Maybe 1 per day    Exercise:  type of activity running;  20 miles per week     Sunscreen: No - discussed    Seatbelts:  Yes    Self Testicular Exam: No - discussed    Physical/Emotional/Sexual Abuse: No -     Do you feel safe in your environment? Yes        Cholesterol screen up to date: Yes 07/31/07    Eye Exam up to date: No - discussed optometry exam    Dental Exam up to date: Yes    Dexa Scan up to date: Does Not Apply    Colonoscopy up to date: Does Not Apply    Immunizations up to date: Yes- TDAP- 2004-     Glucose screen if over 40:  Yes 07/31/07        Guillermina Mojica MA    07/31/07     Social Determinants of Health     Financial Resource Strain: Low Risk  (7/17/2024)    Financial Resource Strain      Within the past 12 months, have you or your family members you live with been unable to get utilities (heat, electricity) when it was really needed?: No   Food Insecurity: Low Risk  (7/17/2024)    Food Insecurity      Within the past 12 months, did you worry that your food would run out before you got money to buy more?: No      Within the past 12 months, did the food you bought just not last and you didn t have money to get more?: No   Transportation Needs: Low Risk  (7/17/2024)    Transportation  Needs      Within the past 12 months, has lack of transportation kept you from medical appointments, getting your medicines, non-medical meetings or appointments, work, or from getting things that you need?: No   Physical Activity: Sufficiently Active (7/17/2024)    Exercise Vital Sign      Days of Exercise per Week: 5 days      Minutes of Exercise per Session: 60 min   Stress: Stress Concern Present (7/17/2024)    [employer] [place] [employer] - Occupational Stress Questionnaire      Feeling of Stress : To some extent   Social Connections: Unknown (7/17/2024)    Social Connection and Isolation Panel [NHANES]      Frequency of Communication with Friends and Family: Not on file      Frequency of Social Gatherings with Friends and Family: More than three times a week      Attends Spiritism Services: Not on file      Active Member of Clubs or Organizations: Not on file      Attends Club or Organization Meetings: Not on file      Marital Status: Not on file   Interpersonal Safety: Low Risk  (7/22/2024)    Interpersonal Safety      Do you feel physically and emotionally safe where you currently live?: Yes      Within the past 12 months, have you been hit, slapped, kicked or otherwise physically hurt by someone?: No      Within the past 12 months, have you been humiliated or emotionally abused in other ways by your partner or ex-partner?: No   Housing Stability: Low Risk  (7/17/2024)    Housing Stability      Do you have housing? : Yes      Are you worried about losing your housing?: No       MEDS:  See EMR, reviewed  ALL:  See EMR, reviewed    REVIEW OF SYSTEMS:  CONSTITUTIONAL:NEGATIVE for fever, chills, change in weight  INTEGUMENTARY/SKIN: NEGATIVE for worrisome rashes, moles or lesions  EYES: NEGATIVE for vision changes or irritation  ENT/MOUTH: NEGATIVE for ear, mouth and throat problems  RESP:NEGATIVE for significant cough or SOB  BREAST: NEGATIVE for masses, tenderness or discharge  CV: NEGATIVE for  chest pain, palpitations or peripheral edema  GI: NEGATIVE for nausea, abdominal pain, heartburn, or change in bowel habits  :NEGATIVE for frequency, dysuria, or hematuria  :NEGATIVE for frequency, dysuria, or hematuria  NEURO: NEGATIVE for weakness, dizziness or paresthesias  ENDOCRINE: NEGATIVE for temperature intolerance, skin/hair changes  HEME/ALLERGY/IMMUNE: NEGATIVE for bleeding problems  PSYCHIATRIC: NEGATIVE for changes in mood or affect      Objective: In the supine position the right shoulder has a 60 degree allowed internal to external rotation arc, compared to 180 degrees with the nonaffected left shoulder.  In the upright position he has 5 out of 5 strength bilaterally at deltoid, supraspinatus, infraspinatus and subscapularis with no weakness noted.  The right shoulder reveals no effusion.  Overlying skin is normal.  Nontender at the AC joint, anterior cuff or biceps tendon of the right shoulder.  No swelling in the right upper extremity.  Sensation is intact distally.  Appropriate in conversation and affect.    Personally viewed with the patient x-rays of the right shoulder that show no significant glenohumeral DJD or bony abnormality.      Assessment: Right-sided shoulder adhesive capsulitis, symptoms x 3 months    Plan: We discussed that on average frozen shoulder takes 9 to 16 months to resolve.  He understands that cortisone shots into the glenohumeral joint can be used to help with discomfort but do not shorten the course of frozen shoulder.  We discussed physical therapy options.  He declines the need for a cortisone shot at this time.  He preferred a set of handouts of home range of motion exercises for frozen shoulder.  Informational handout and exercise sheet provided.  He will look for slow improvements over the upcoming months.  He had no further questions and will follow-up as needed.                    Again, thank you for allowing me to participate in the care of your patient.       Sincerely,    Rommel Hernandez MD

## 2024-08-13 ENCOUNTER — HOSPITAL ENCOUNTER (OUTPATIENT)
Dept: CT IMAGING | Facility: CLINIC | Age: 59
Discharge: HOME | End: 2024-08-13
Attending: PHYSICIAN ASSISTANT | Admitting: PHYSICIAN ASSISTANT
Payer: COMMERCIAL

## 2024-08-13 DIAGNOSIS — Z13.6 CARDIOVASCULAR SCREENING; LDL GOAL LESS THAN 160: ICD-10-CM

## 2024-08-13 PROCEDURE — 75571 CT HRT W/O DYE W/CA TEST: CPT

## 2024-08-13 PROCEDURE — 75571 CT HRT W/O DYE W/CA TEST: CPT | Mod: 26 | Performed by: INTERNAL MEDICINE

## 2024-08-13 NOTE — RESULT ENCOUNTER NOTE
Dear Darek    Your test results are attached, feel free to contact me via [employer]     This is about as good as a result as we can ever expect with these test.  A score of 0 shows no sign of calcified plaque.  Let me know if you have any further questions.    Jaylan Collins PA-C

## 2024-09-27 ENCOUNTER — IMMUNIZATION (OUTPATIENT)
Dept: FAMILY MEDICINE | Facility: CLINIC | Age: 59
End: 2024-09-27
Payer: COMMERCIAL

## 2024-09-27 DIAGNOSIS — Z23 HIGH PRIORITY FOR 2019-NCOV VACCINE: ICD-10-CM

## 2024-09-27 DIAGNOSIS — Z23 NEED FOR PROPHYLACTIC VACCINATION AND INOCULATION AGAINST INFLUENZA: Primary | ICD-10-CM

## 2024-09-27 PROCEDURE — 91320 SARSCV2 VAC 30MCG TRS-SUC IM: CPT

## 2024-09-27 PROCEDURE — 90471 IMMUNIZATION ADMIN: CPT

## 2024-09-27 PROCEDURE — 90480 ADMN SARSCOV2 VAC 1/ONLY CMP: CPT

## 2024-09-27 PROCEDURE — 90673 RIV3 VACCINE NO PRESERV IM: CPT

## 2024-09-27 PROCEDURE — 99207 PR NO CHARGE NURSE ONLY: CPT

## 2025-02-02 DIAGNOSIS — G47.00 INSOMNIA, UNSPECIFIED TYPE: ICD-10-CM

## 2025-02-03 RX ORDER — ZOLPIDEM TARTRATE 10 MG/1
TABLET ORAL
Qty: 30 TABLET | Refills: 4 | Status: SHIPPED | OUTPATIENT
Start: 2025-02-03

## 2025-09-03 ENCOUNTER — MYC MEDICAL ADVICE (OUTPATIENT)
Dept: FAMILY MEDICINE | Facility: CLINIC | Age: 60
End: 2025-09-03
Payer: COMMERCIAL

## (undated) RX ORDER — FENTANYL CITRATE 50 UG/ML
INJECTION, SOLUTION INTRAMUSCULAR; INTRAVENOUS
Status: DISPENSED
Start: 2017-12-21